# Patient Record
Sex: MALE | Race: WHITE | Employment: OTHER | URBAN - METROPOLITAN AREA
[De-identification: names, ages, dates, MRNs, and addresses within clinical notes are randomized per-mention and may not be internally consistent; named-entity substitution may affect disease eponyms.]

---

## 2021-02-26 ENCOUNTER — PROCEDURE VISIT (OUTPATIENT)
Dept: NEUROLOGY | Facility: CLINIC | Age: 66
End: 2021-02-26
Payer: MEDICARE

## 2021-02-26 VITALS — WEIGHT: 225 LBS | BODY MASS INDEX: 27.98 KG/M2 | HEIGHT: 75 IN

## 2021-02-26 DIAGNOSIS — R20.0 NUMBNESS IN FEET: Primary | ICD-10-CM

## 2021-02-26 PROCEDURE — 95911 NRV CNDJ TEST 9-10 STUDIES: CPT | Performed by: PHYSICAL MEDICINE & REHABILITATION

## 2021-02-26 PROCEDURE — 95886 MUSC TEST DONE W/N TEST COMP: CPT | Performed by: PHYSICAL MEDICINE & REHABILITATION

## 2021-02-26 NOTE — PROCEDURES
[address]   Nerve Conduction Study and Electromyography Procedure Note      Patient: Jinx Favre Hand Dominance:  RIGHT   Patient ID: [id number] Referring Dr:  Niru Figueroa   Sex: Male Test Dr:  Milena Young Lat leg Ankle NR NR NR NR Lat leg - Ankle 14 NR       H Reflex      Nerve H Lat No H ?    ms    R Tibial - Soleus NR No   Ref. ?35.00    L Tibial - Soleus 20.94 Yes   Ref. ?35.00        EMG Summary Table     Spontaneous MUAP Recruitment   Muscle Nerve R were found in 1 nerve(s), as follows:  • In the R Tibial - Soleus study  o the response was considered absent      Electromyography  The needle EMG study was normal in all 12 tested muscles: R. Vastus medialis, R. Gastrocnemius (Medial head), R. Peroneus l

## 2021-03-15 ENCOUNTER — MED REC SCAN ONLY (OUTPATIENT)
Dept: NEUROLOGY | Facility: CLINIC | Age: 66
End: 2021-03-15

## 2021-04-02 ENCOUNTER — MED REC SCAN ONLY (OUTPATIENT)
Dept: NEUROLOGY | Facility: CLINIC | Age: 66
End: 2021-04-02

## 2021-07-14 ENCOUNTER — OFFICE VISIT (OUTPATIENT)
Dept: NEUROLOGY | Facility: CLINIC | Age: 66
End: 2021-07-14
Payer: MEDICARE

## 2021-07-14 ENCOUNTER — TELEPHONE (OUTPATIENT)
Dept: NEUROLOGY | Facility: CLINIC | Age: 66
End: 2021-07-14

## 2021-07-14 ENCOUNTER — HOSPITAL ENCOUNTER (OUTPATIENT)
Dept: GENERAL RADIOLOGY | Age: 66
Discharge: HOME | End: 2021-07-14
Attending: PHYSICAL MEDICINE & REHABILITATION
Payer: MEDICARE

## 2021-07-14 VITALS
HEIGHT: 75 IN | SYSTOLIC BLOOD PRESSURE: 120 MMHG | WEIGHT: 215 LBS | BODY MASS INDEX: 26.73 KG/M2 | DIASTOLIC BLOOD PRESSURE: 68 MMHG | HEART RATE: 131 BPM | OXYGEN SATURATION: 98 %

## 2021-07-14 DIAGNOSIS — M51.16 LUMBAR DISC HERNIATION WITH RADICULOPATHY: ICD-10-CM

## 2021-07-14 DIAGNOSIS — M99.9 BIOMECHANICAL LESION: ICD-10-CM

## 2021-07-14 DIAGNOSIS — R20.0 NUMBNESS IN FEET: ICD-10-CM

## 2021-07-14 DIAGNOSIS — M79.10 MYALGIA: ICD-10-CM

## 2021-07-14 DIAGNOSIS — M47.816 FACET SYNDROME, LUMBAR: ICD-10-CM

## 2021-07-14 DIAGNOSIS — E07.9 THYROID DISEASE: ICD-10-CM

## 2021-07-14 DIAGNOSIS — M54.59 MECHANICAL LOW BACK PAIN: ICD-10-CM

## 2021-07-14 DIAGNOSIS — M51.37 DDD (DEGENERATIVE DISC DISEASE), LUMBOSACRAL: ICD-10-CM

## 2021-07-14 DIAGNOSIS — M25.552 LEFT HIP PAIN: ICD-10-CM

## 2021-07-14 DIAGNOSIS — G62.89 OTHER POLYNEUROPATHY: ICD-10-CM

## 2021-07-14 DIAGNOSIS — R20.0 NUMBNESS IN FEET: Primary | ICD-10-CM

## 2021-07-14 PROBLEM — G62.9 PERIPHERAL NEUROPATHY: Status: ACTIVE | Noted: 2021-07-14

## 2021-07-14 PROBLEM — M51.379 DDD (DEGENERATIVE DISC DISEASE), LUMBOSACRAL: Status: ACTIVE | Noted: 2021-07-14

## 2021-07-14 PROCEDURE — 73521 X-RAY EXAM HIPS BI 2 VIEWS: CPT | Performed by: PHYSICAL MEDICINE & REHABILITATION

## 2021-07-14 PROCEDURE — 99204 OFFICE O/P NEW MOD 45 MIN: CPT | Performed by: PHYSICAL MEDICINE & REHABILITATION

## 2021-07-14 PROCEDURE — 72110 X-RAY EXAM L-2 SPINE 4/>VWS: CPT | Performed by: PHYSICAL MEDICINE & REHABILITATION

## 2021-07-14 RX ORDER — CLONAZEPAM 0.5 MG/1
TABLET ORAL
COMMUNITY

## 2021-07-14 RX ORDER — ALPRAZOLAM 0.5 MG/1
TABLET ORAL
COMMUNITY
End: 2021-09-10

## 2021-07-14 RX ORDER — LEVOTHYROXINE SODIUM 0.07 MG/1
TABLET ORAL
COMMUNITY

## 2021-07-14 RX ORDER — AMOXICILLIN 500 MG
CAPSULE ORAL
COMMUNITY

## 2021-07-14 RX ORDER — CLONAZEPAM 0.5 MG/1
0.5 TABLET ORAL 3 TIMES DAILY
COMMUNITY
Start: 2021-06-28 | End: 2021-11-29

## 2021-07-14 RX ORDER — LEVOTHYROXINE SODIUM 0.07 MG/1
TABLET ORAL
COMMUNITY
Start: 2021-05-21 | End: 2021-09-10

## 2021-07-14 RX ORDER — GABAPENTIN 100 MG/1
100 CAPSULE ORAL 3 TIMES DAILY
Qty: 90 CAPSULE | Refills: 1 | Status: SHIPPED | OUTPATIENT
Start: 2021-07-14 | End: 2021-09-10

## 2021-07-14 RX ORDER — ALPRAZOLAM 0.5 MG/1
0.5 TABLET ORAL
COMMUNITY
Start: 2021-06-21

## 2021-07-14 RX ORDER — FOLIC ACID 1 MG/1
TABLET ORAL
COMMUNITY
Start: 2021-05-21 | End: 2021-09-10

## 2021-07-14 RX ORDER — FOLIC ACID 1 MG/1
TABLET ORAL
COMMUNITY

## 2021-07-14 NOTE — PATIENT INSTRUCTIONS
1) Make an appointment with your PCP to discuss thyroid function level and have them check folate and B12 as these can cause neuropathy  2) Get XR of the lumbar spine and pelvis today on your way out  3) My office will call you once the MRI is approved by

## 2021-07-14 NOTE — TELEPHONE ENCOUNTER
Per Medicare Guidelines -no authorization is required for imaging    Status: Approved-Covered Benefit     Patient notified he can proceed with scheduling L-Spine MRI CPT 09451.  Patient is leaving the office now and will call later to schedule imaging

## 2021-07-14 NOTE — H&P
[address] H&P    Requesting Physician: Rosemarie Holley MD    Chief Complaint (Reason for Visit):  Patient presents with:  New Patient: Bilateral low back pain.  Thalia Sotelo feels numbness from knee dfown to t history. PAST SURGICAL HISTORY:   History reviewed. No pertinent surgical history. FAMILY HISTORY:   History reviewed. No pertinent family history.     SOCIAL HISTORY:   Social History    Occupational History      Not on file    Tobacco Use      Smok negative. Pertinent positives and negatives noted in the HPI. PHYSICAL EXAM:   /68   Pulse (!) 131   Ht 75\"   Wt 215 lb (97.5 kg)   SpO2 98%   BMI 26.87 kg/m²     Body mass index is 26.87 kg/m².       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 Work-up recommended including metabolic, vitamin deficiency, or endocrine disease.   · The no response seen in the right tibial H reflex study can be due t start formal physical therapy and follow-up with me in 6 to 8 weeks after he begins therapy as well as after his MRI to discuss any potential treatment plans which may include epidural or facet joint injections.        RTC in 6 to 8 weeks    Discharge Instr

## 2021-07-27 ENCOUNTER — HOSPITAL ENCOUNTER (OUTPATIENT)
Dept: MRI IMAGING | Facility: HOSPITAL | Age: 66
Discharge: HOME | End: 2021-07-27
Attending: PHYSICAL MEDICINE & REHABILITATION
Payer: MEDICARE

## 2021-07-27 DIAGNOSIS — M54.59 MECHANICAL LOW BACK PAIN: ICD-10-CM

## 2021-07-27 DIAGNOSIS — M51.16 LUMBAR DISC HERNIATION WITH RADICULOPATHY: ICD-10-CM

## 2021-07-27 DIAGNOSIS — E07.9 THYROID DISEASE: ICD-10-CM

## 2021-07-27 DIAGNOSIS — M47.816 FACET SYNDROME, LUMBAR: ICD-10-CM

## 2021-07-27 DIAGNOSIS — M79.10 MYALGIA: ICD-10-CM

## 2021-07-27 DIAGNOSIS — M51.37 DDD (DEGENERATIVE DISC DISEASE), LUMBOSACRAL: ICD-10-CM

## 2021-07-27 DIAGNOSIS — M25.552 LEFT HIP PAIN: ICD-10-CM

## 2021-07-27 DIAGNOSIS — G62.89 OTHER POLYNEUROPATHY: ICD-10-CM

## 2021-07-27 DIAGNOSIS — M99.9 BIOMECHANICAL LESION: ICD-10-CM

## 2021-07-27 DIAGNOSIS — R20.0 NUMBNESS IN FEET: ICD-10-CM

## 2021-07-27 PROCEDURE — 72148 MRI LUMBAR SPINE W/O DYE: CPT | Performed by: PHYSICAL MEDICINE & REHABILITATION

## 2021-07-29 ENCOUNTER — PATIENT MESSAGE (OUTPATIENT)
Dept: NEUROLOGY | Facility: CLINIC | Age: 66
End: 2021-07-29

## 2021-07-29 NOTE — TELEPHONE ENCOUNTER
From: Abelardo Kumar  To: Gabriele Rodriguez MD  Sent: 7/29/2021 9:45 AM CDT  Subject: Test Results Question    Dr.Behar:  I have had the MRI you prescribed and the results are available. I also saw my PCP and will have the lab work he prescribed today.  I have als

## 2021-08-06 ENCOUNTER — MED REC SCAN ONLY (OUTPATIENT)
Dept: NEUROLOGY | Facility: CLINIC | Age: 66
End: 2021-08-06

## 2021-08-06 ENCOUNTER — TELEPHONE (OUTPATIENT)
Dept: NEUROLOGY | Facility: CLINIC | Age: 66
End: 2021-08-06

## 2021-08-06 ENCOUNTER — TELEMEDICINE (OUTPATIENT)
Dept: NEUROLOGY | Facility: CLINIC | Age: 66
End: 2021-08-06

## 2021-08-06 DIAGNOSIS — M51.37 DDD (DEGENERATIVE DISC DISEASE), LUMBOSACRAL: ICD-10-CM

## 2021-08-06 DIAGNOSIS — M79.10 MYALGIA: ICD-10-CM

## 2021-08-06 DIAGNOSIS — M25.552 LEFT HIP PAIN: ICD-10-CM

## 2021-08-06 DIAGNOSIS — R20.0 NUMBNESS IN FEET: Primary | ICD-10-CM

## 2021-08-06 DIAGNOSIS — M54.59 MECHANICAL LOW BACK PAIN: ICD-10-CM

## 2021-08-06 DIAGNOSIS — M51.16 LUMBAR DISC HERNIATION WITH RADICULOPATHY: ICD-10-CM

## 2021-08-06 DIAGNOSIS — E07.9 THYROID DISEASE: ICD-10-CM

## 2021-08-06 DIAGNOSIS — G62.89 OTHER POLYNEUROPATHY: ICD-10-CM

## 2021-08-06 DIAGNOSIS — M99.9 BIOMECHANICAL LESION: ICD-10-CM

## 2021-08-06 DIAGNOSIS — M47.816 FACET SYNDROME, LUMBAR: ICD-10-CM

## 2021-08-06 PROCEDURE — 99214 OFFICE O/P EST MOD 30 MIN: CPT | Performed by: PHYSICAL MEDICINE & REHABILITATION

## 2021-08-06 NOTE — TELEPHONE ENCOUNTER
Patient has been scheduled for a BILATERAL L5 transforaminal epidural steroid injections on 8/24/21 at the [address] [place] [address]. Medications and allergies reviewed.  Patient informed we will need verbal or written authorization from patients Primary Care Physician/Cardiolo

## 2021-08-06 NOTE — PATIENT INSTRUCTIONS
1) My office will call you to schedule the LEFT hip CSI under ultrasound guidance once the procedure is approved by your insurance carrier.     2) My office will call you to schedule the BILATRAL L5 TFESI under local anesthesia once the procedure is approve

## 2021-08-06 NOTE — TELEPHONE ENCOUNTER
Per Medicare guidelines-no authorization required for Lidocaine/Kenalog injections in office    LEFT hip CSI under ultrasound guidance CPT 44228+    Status: Approved-Covered Benefit    Routing to clinical staff to schedule    Per Medicare Guidelines -

## 2021-08-16 ENCOUNTER — OFFICE VISIT (OUTPATIENT)
Dept: NEUROLOGY | Facility: CLINIC | Age: 66
End: 2021-08-16
Payer: MEDICARE

## 2021-08-16 ENCOUNTER — MED REC SCAN ONLY (OUTPATIENT)
Dept: NEUROLOGY | Facility: CLINIC | Age: 66
End: 2021-08-16

## 2021-08-16 DIAGNOSIS — M25.859 FEMORAL ACETABULAR IMPINGEMENT: ICD-10-CM

## 2021-08-16 DIAGNOSIS — M25.552 LEFT HIP PAIN: ICD-10-CM

## 2021-08-16 DIAGNOSIS — M79.10 MYALGIA: Primary | ICD-10-CM

## 2021-08-16 PROCEDURE — 20611 DRAIN/INJ JOINT/BURSA W/US: CPT | Performed by: PHYSICAL MEDICINE & REHABILITATION

## 2021-08-16 RX ORDER — LIDOCAINE HYDROCHLORIDE 10 MG/ML
14 INJECTION, SOLUTION INFILTRATION; PERINEURAL ONCE
Status: COMPLETED | OUTPATIENT
Start: 2021-08-16 | End: 2021-08-16

## 2021-08-16 RX ORDER — TRIAMCINOLONE ACETONIDE 40 MG/ML
40 INJECTION, SUSPENSION INTRA-ARTICULAR; INTRAMUSCULAR ONCE
Status: COMPLETED | OUTPATIENT
Start: 2021-08-16 | End: 2021-08-16

## 2021-08-16 NOTE — PROCEDURES
[address]   Hip Injection Procedure Note    CHIEF COMPLAINT:  Patient presents with:   Injection: Left Hip injection csi       PROCEDURE PERFORMED:  LEFT hip  corticosteroid injection under ultrasound nadia assessment and plan. Patient is in agreement with the plan. All questions were answered. No barriers to learning identified. Permanent pictures were saved. INSTRUCTIONS GIVEN TO PATIENT:    \"You will see an effect in the next 2-3 days.   Please co

## 2021-08-24 ENCOUNTER — TELEPHONE (OUTPATIENT)
Dept: NEUROLOGY | Facility: CLINIC | Age: 66
End: 2021-08-24

## 2021-08-24 ENCOUNTER — OFFICE VISIT (OUTPATIENT)
Dept: SURGERY | Facility: CLINIC | Age: 66
End: 2021-08-24

## 2021-08-24 DIAGNOSIS — M51.16 LUMBAR DISC HERNIATION WITH RADICULOPATHY: ICD-10-CM

## 2021-08-24 DIAGNOSIS — M48.062 SPINAL STENOSIS OF LUMBAR REGION WITH NEUROGENIC CLAUDICATION: ICD-10-CM

## 2021-08-24 DIAGNOSIS — M47.816 LUMBAR SPONDYLOSIS: ICD-10-CM

## 2021-08-24 DIAGNOSIS — M51.37 DDD (DEGENERATIVE DISC DISEASE), LUMBOSACRAL: Primary | ICD-10-CM

## 2021-08-24 DIAGNOSIS — M48.061 LUMBAR FORAMINAL STENOSIS: ICD-10-CM

## 2021-08-24 DIAGNOSIS — M51.26 BULGE OF LUMBAR DISC WITHOUT MYELOPATHY: ICD-10-CM

## 2021-08-24 DIAGNOSIS — M54.59 LUMBAR TRIGGER POINT SYNDROME: ICD-10-CM

## 2021-08-24 DIAGNOSIS — M47.816 FACET SYNDROME, LUMBAR: ICD-10-CM

## 2021-08-24 DIAGNOSIS — M54.59 MECHANICAL LOW BACK PAIN: ICD-10-CM

## 2021-08-24 PROCEDURE — 64483 NJX AA&/STRD TFRM EPI L/S 1: CPT | Performed by: PHYSICAL MEDICINE & REHABILITATION

## 2021-08-24 NOTE — PROCEDURES
[address]    BILATERAL LUMBAR TRANSFORAMINAL   NAME:  Sam Chacon    MR #:    [id number] :  3/26/1955     PHYSICIAN:  Ermelinda Flynn MD        Operative Report    DATE OF PROCEDURE: 2021   PREOPERATIVE DIAGNOSES: 1. DDD (deg position, AP fluoroscopy was used to advance the needle to the 6 o'clock position on the right L5 pedicle. At this point in time, Omnipaque-240 contrast was used to obtain a good epidurogram indicating correct needle placement.   Then, aspiration was perfo

## 2021-09-10 ENCOUNTER — OFFICE VISIT (OUTPATIENT)
Dept: PHYSICAL MEDICINE AND REHAB | Facility: CLINIC | Age: 66
End: 2021-09-10
Payer: MEDICARE

## 2021-09-10 VITALS
OXYGEN SATURATION: 98 % | HEART RATE: 130 BPM | SYSTOLIC BLOOD PRESSURE: 116 MMHG | WEIGHT: 215 LBS | HEIGHT: 75 IN | DIASTOLIC BLOOD PRESSURE: 68 MMHG | BODY MASS INDEX: 26.73 KG/M2

## 2021-09-10 DIAGNOSIS — E07.9 THYROID DISEASE: ICD-10-CM

## 2021-09-10 DIAGNOSIS — R20.0 NUMBNESS IN FEET: ICD-10-CM

## 2021-09-10 DIAGNOSIS — M51.37 DDD (DEGENERATIVE DISC DISEASE), LUMBOSACRAL: ICD-10-CM

## 2021-09-10 DIAGNOSIS — M25.552 LEFT HIP PAIN: ICD-10-CM

## 2021-09-10 DIAGNOSIS — G62.9 PERIPHERAL POLYNEUROPATHY: Primary | ICD-10-CM

## 2021-09-10 DIAGNOSIS — M51.16 LUMBAR DISC HERNIATION WITH RADICULOPATHY: ICD-10-CM

## 2021-09-10 PROCEDURE — 99214 OFFICE O/P EST MOD 30 MIN: CPT | Performed by: PHYSICAL MEDICINE & REHABILITATION

## 2021-09-10 RX ORDER — GABAPENTIN 100 MG/1
200 CAPSULE ORAL 2 TIMES DAILY
Qty: 120 CAPSULE | Refills: 0 | Status: SHIPPED | OUTPATIENT
Start: 2021-09-10 | End: 2021-10-10

## 2021-09-10 NOTE — PATIENT INSTRUCTIONS
1) Increase gabapentin to 200 mg at nighttime and continue on 100 mg in the morning.  If you do not find it being to sedating, try increasing the morning dose to 200 mg as well  2) If gabapentin is not beneficial, then would recommend either starting pregab

## 2021-09-10 NOTE — PROGRESS NOTES
[address]  Progress Note    CHIEF COMPLAINT:  Patient presents with:  Low Back Pain: Patient f/u on Bilateral L5 TFESI (8/24/21) with 80% improvement presently.   C/o of bilateral low back pain (R>L) with N/ daily as needed. • Cholecalciferol 50 MCG (2000 UT) Oral Cap Take by mouth.      • clonazePAM 0.5 MG Oral Tab clonazepam 0.5 mg tablet   TAKE ONE TABLET BY MOUTH THREE TIMES DAILY     • folic acid 1 MG Oral Tab folic acid 1 mg tablet   Take 1 tablet(s) found for any previous visit.   ]      Radiology Imaging:  I reviewed with the patient his X-ray and MRI of the lumbar spine and pelvis bilateral  MRI SPINE LUMBAR (CPT=72148)  Narrative: PROCEDURE: MRI SPINE LUMBAR (CPT=72148)     COMPARISON: Lan Camargo mild-to-moderate left neural foraminal with mild right lateral recess stenosis.   L5-S1: Disc and facet related degeneration, which results in mild-to-moderate right greater than left neural foraminal stenosis with no spinal canal or lateral recess compromi JOINTS: Normal.     FLEXION/EXTENSION VIEWS:   No subluxation during flexion and extension.     OTHER:   Negative.                   Impression   CONCLUSION:   1.  Moderate-advanced L3-4 and L4-5 degenerative disc disease/spondylosis.  Less pronounced degen mononeuropathy.     There is no evidence for a  plexopathy or myopathy in the tested limb(s). ASSESSMENT AND PLAN:  Wilfred Hitchcock is a 58-year-old male. As a pertains with the epidural injection as well as corticosteroid injection.   As a pertains to the chroni

## 2021-09-30 ENCOUNTER — OFFICE VISIT (OUTPATIENT)
Dept: NEUROLOGY | Facility: CLINIC | Age: 66
End: 2021-09-30
Payer: MEDICARE

## 2021-09-30 ENCOUNTER — LAB ENCOUNTER (OUTPATIENT)
Dept: LAB | Facility: HOSPITAL | Age: 66
End: 2021-09-30
Attending: Other
Payer: MEDICARE

## 2021-09-30 VITALS
HEART RATE: 129 BPM | WEIGHT: 220 LBS | BODY MASS INDEX: 27.35 KG/M2 | SYSTOLIC BLOOD PRESSURE: 128 MMHG | HEIGHT: 75 IN | DIASTOLIC BLOOD PRESSURE: 72 MMHG

## 2021-09-30 DIAGNOSIS — G62.9 NEUROPATHY: ICD-10-CM

## 2021-09-30 DIAGNOSIS — G62.9 NEUROPATHY: Primary | ICD-10-CM

## 2021-09-30 PROCEDURE — 86039 ANTINUCLEAR ANTIBODIES (ANA): CPT

## 2021-09-30 PROCEDURE — 83036 HEMOGLOBIN GLYCOSYLATED A1C: CPT

## 2021-09-30 PROCEDURE — 99204 OFFICE O/P NEW MOD 45 MIN: CPT | Performed by: OTHER

## 2021-09-30 PROCEDURE — 82746 ASSAY OF FOLIC ACID SERUM: CPT

## 2021-09-30 PROCEDURE — 84165 PROTEIN E-PHORESIS SERUM: CPT

## 2021-09-30 PROCEDURE — 36415 COLL VENOUS BLD VENIPUNCTURE: CPT

## 2021-09-30 PROCEDURE — 86038 ANTINUCLEAR ANTIBODIES: CPT

## 2021-09-30 PROCEDURE — 82607 VITAMIN B-12: CPT

## 2021-09-30 PROCEDURE — 85652 RBC SED RATE AUTOMATED: CPT

## 2021-09-30 PROCEDURE — 82784 ASSAY IGA/IGD/IGG/IGM EACH: CPT

## 2021-09-30 RX ORDER — LATANOPROST 50 UG/ML
SOLUTION/ DROPS OPHTHALMIC
COMMUNITY

## 2021-09-30 NOTE — PROGRESS NOTES
Mr. Clover Awad, was referred by Ernesto Chappell, a personal friend. He relates a 2-3-year history of numbness paresthesias which started in the toes and has ascended to distal lower calf. He has also noticed some trouble with balance.   No numbness paresthesi the metabolic work-up–more recent B12, hemoglobin A1c, sed rate, VERÓNICA, serum protein electrophoresis, folic acid, immunoelectrophoresis. I do know that he is receiving B12 shots periodically and that he is on folic acid supplement.   Also asked him to inves

## 2021-10-07 ENCOUNTER — TELEPHONE (OUTPATIENT)
Dept: PHYSICAL MEDICINE AND REHAB | Facility: CLINIC | Age: 66
End: 2021-10-07

## 2021-10-07 NOTE — TELEPHONE ENCOUNTER
Patient is requesting a call back to review his lab work and discuss next steps of treatment. Please Advise.

## 2021-10-08 NOTE — TELEPHONE ENCOUNTER
Please call the patient tell him that his hemoglobin A1c is elevated. This is a sign of early diabetes. This may be causing the neuropathy. Please also ask him if he found out how much vitamin B6 he is taking.   Thank you

## 2021-10-08 NOTE — TELEPHONE ENCOUNTER
Pt returning call.      According to patient he takes 1 super B complex daily which contains 5mg of B6

## 2021-10-08 NOTE — TELEPHONE ENCOUNTER
Spoke to pt to notify of A1C & pt verbalized will follow with PCP. Pt states he takes a Super B-Complex (1 PO daily) which contains 5mg of B6.

## 2021-10-22 ENCOUNTER — TELEMEDICINE (OUTPATIENT)
Dept: PHYSICAL MEDICINE AND REHAB | Facility: CLINIC | Age: 66
End: 2021-10-22

## 2021-10-22 ENCOUNTER — TELEPHONE (OUTPATIENT)
Dept: NEUROLOGY | Facility: CLINIC | Age: 66
End: 2021-10-22

## 2021-10-22 DIAGNOSIS — M51.26 BULGE OF LUMBAR DISC WITHOUT MYELOPATHY: ICD-10-CM

## 2021-10-22 DIAGNOSIS — M54.59 LUMBAR TRIGGER POINT SYNDROME: ICD-10-CM

## 2021-10-22 DIAGNOSIS — M25.552 LEFT HIP PAIN: ICD-10-CM

## 2021-10-22 DIAGNOSIS — M48.061 LUMBAR FORAMINAL STENOSIS: ICD-10-CM

## 2021-10-22 DIAGNOSIS — M54.59 MECHANICAL LOW BACK PAIN: ICD-10-CM

## 2021-10-22 DIAGNOSIS — M47.816 FACET SYNDROME, LUMBAR: ICD-10-CM

## 2021-10-22 DIAGNOSIS — M25.859 FEMORAL ACETABULAR IMPINGEMENT: ICD-10-CM

## 2021-10-22 DIAGNOSIS — M79.10 MYALGIA: ICD-10-CM

## 2021-10-22 DIAGNOSIS — M48.062 SPINAL STENOSIS OF LUMBAR REGION WITH NEUROGENIC CLAUDICATION: ICD-10-CM

## 2021-10-22 DIAGNOSIS — E07.9 THYROID DISEASE: ICD-10-CM

## 2021-10-22 DIAGNOSIS — M99.9 BIOMECHANICAL LESION: ICD-10-CM

## 2021-10-22 DIAGNOSIS — R20.0 NUMBNESS IN FEET: ICD-10-CM

## 2021-10-22 DIAGNOSIS — M51.37 DDD (DEGENERATIVE DISC DISEASE), LUMBOSACRAL: ICD-10-CM

## 2021-10-22 DIAGNOSIS — M47.816 LUMBAR SPONDYLOSIS: ICD-10-CM

## 2021-10-22 DIAGNOSIS — G62.9 PERIPHERAL POLYNEUROPATHY: Primary | ICD-10-CM

## 2021-10-22 DIAGNOSIS — M51.16 LUMBAR DISC HERNIATION WITH RADICULOPATHY: ICD-10-CM

## 2021-10-22 PROCEDURE — 99214 OFFICE O/P EST MOD 30 MIN: CPT | Performed by: PHYSICAL MEDICINE & REHABILITATION

## 2021-10-22 NOTE — PATIENT INSTRUCTIONS
1) Stop gabapentin  2) My office will call you to schedule the BILATERAL L5 TFESI under local anesthesia once the procedure is approved by your insurance carrier.     3) Make appointment with [facility] to discuss other causes of neuropathy  4) Follow up

## 2021-10-22 NOTE — TELEPHONE ENCOUNTER
Per Medicare guidelines authorization is not required for BILATERAL L5 TFESI cpt codes 03794-83, 61367. Will inform Nursing.

## 2021-10-22 NOTE — TELEPHONE ENCOUNTER
Spoke to patient who request MAC sedation as he had local anesthetic for the previous procedure and does not wish to do that again.     Dr. Guanaco Reynoso agree to proceed w/ MAC    Patient has been scheduled for a Bilateral L5 TFESIs under MAC  on 11/10/21 at the

## 2021-10-22 NOTE — PROGRESS NOTES
[address]  Video Visit Progress Note      Telehealth outside of [address] Verbal Consent   I conducted a telehealth visit with Cesia Mohamud today, 10/22/21, which was completed using two-way, ulices bilateral and midline low back pain with left medial groin pain.   Status post left hip corticosteroid injection on and bilateral L5 transforaminal epidural steroid injection in August.  He is now complaining of continued bilateral low back pain where he fe Oral Tab levothyroxine 75 mcg tablet   Take 1 tablet every day by oral route for 90 days. • Omega-3 Fatty Acids (FISH OIL) 1200 MG Oral Cap Take by mouth.          ALLERGIES:   No Known Allergies    REVIEW OF SYSTEMS:   Review of Systems   Constitutiona Rey Escalona M.D.     Final   • Immunoglobulin A 09/30/2021 271.00  70.00 - 312.00 mg/dL Final   • Immunoglobulin G 09/30/2021 1,020  791-1,643 mg/dL Final   • Immunoglobulin M 09/30/2021 185.0  43.0 - 279.0 mg/dL Final   • HgbA1C 09/30/2021 5.9* <5.7 % Final   • redundancy. Minor left neural foraminal stenosis with no additional significant neural   compromise. L3-L4: Right eccentric disc bulge with dorsal epidural lipomatosis, ligamentum flavum redundancy, and bilateral facet arthropathy.   Mild-to-moderate spin this was significantly beneficial during her last procedure. I am also recommending he stop taking gabapentin as it has not been beneficial and follow-up with neurology which she would like to do at [facility].   He has a family friend who is a physician pain  Myalgia  Facet syndrome, lumbar  Biomechanical lesion  Femoral acetabular impingement    Roney Mccarty MD  Physical Medicine and Rehabilitation/Sports Medicine  MEDICAL CENTER [facility]

## 2021-11-05 ENCOUNTER — TELEPHONE (OUTPATIENT)
Dept: PHYSICAL MEDICINE AND REHAB | Facility: CLINIC | Age: 66
End: 2021-11-05

## 2021-11-05 DIAGNOSIS — Z20.822 ENCOUNTER FOR PREPROCEDURE SCREENING LABORATORY TESTING FOR COVID-19: Primary | ICD-10-CM

## 2021-11-05 DIAGNOSIS — Z01.812 ENCOUNTER FOR PREPROCEDURE SCREENING LABORATORY TESTING FOR COVID-19: Primary | ICD-10-CM

## 2021-11-05 NOTE — TELEPHONE ENCOUNTER
Tiffany spoke w Ev, states he is but would have to schedule that on his own and would need an order from PCP- i double checked and she said PCP no ordering provider    Tiffany informed patient, did not want to get order from PCP, will want order placed by

## 2021-11-05 NOTE — TELEPHONE ENCOUNTER
Order placed. Please let patient know and fax order to where he needs it faxed to. Can also discuss with [facility] if he can have an independent COVID test performed outside of the [facility] system and bring in the test results. Sirisha Hahn.  Twan Galeano MD, [facility] CAQSM  P

## 2021-11-05 NOTE — TELEPHONE ENCOUNTER
Patient will continue the test at [facility]. Does not want to do [facility]. Will schedule on Sunday.

## 2021-11-07 ENCOUNTER — LAB ENCOUNTER (OUTPATIENT)
Dept: LAB | Facility: HOSPITAL | Age: 66
End: 2021-11-07
Attending: PHYSICAL MEDICINE & REHABILITATION
Payer: MEDICARE

## 2021-11-07 DIAGNOSIS — Z01.812 ENCOUNTER FOR PREPROCEDURE SCREENING LABORATORY TESTING FOR COVID-19: ICD-10-CM

## 2021-11-07 DIAGNOSIS — Z20.822 ENCOUNTER FOR PREPROCEDURE SCREENING LABORATORY TESTING FOR COVID-19: ICD-10-CM

## 2021-11-10 ENCOUNTER — OFFICE VISIT (OUTPATIENT)
Dept: SURGERY | Facility: CLINIC | Age: 66
End: 2021-11-10

## 2021-11-10 DIAGNOSIS — M51.37 DDD (DEGENERATIVE DISC DISEASE), LUMBOSACRAL: Primary | ICD-10-CM

## 2021-11-10 DIAGNOSIS — M51.16 LUMBAR DISC HERNIATION WITH RADICULOPATHY: ICD-10-CM

## 2021-11-10 DIAGNOSIS — M48.061 LUMBAR FORAMINAL STENOSIS: ICD-10-CM

## 2021-11-10 DIAGNOSIS — M47.816 FACET SYNDROME, LUMBAR: ICD-10-CM

## 2021-11-10 DIAGNOSIS — M54.59 LUMBAR TRIGGER POINT SYNDROME: ICD-10-CM

## 2021-11-10 DIAGNOSIS — M54.59 MECHANICAL LOW BACK PAIN: ICD-10-CM

## 2021-11-10 DIAGNOSIS — M48.062 SPINAL STENOSIS OF LUMBAR REGION WITH NEUROGENIC CLAUDICATION: ICD-10-CM

## 2021-11-10 DIAGNOSIS — M47.816 LUMBAR SPONDYLOSIS: ICD-10-CM

## 2021-11-10 DIAGNOSIS — M51.26 BULGE OF LUMBAR DISC WITHOUT MYELOPATHY: ICD-10-CM

## 2021-11-10 PROCEDURE — 64483 NJX AA&/STRD TFRM EPI L/S 1: CPT | Performed by: PHYSICAL MEDICINE & REHABILITATION

## 2021-11-10 NOTE — PROCEDURES
[address]    BILATERAL LUMBAR TRANSFORAMINAL   NAME:  Joselyn Greco    MR #:    [id number] :  3/26/1955     PHYSICIAN:  Nitza Ferris MD        Operative Report    DATE OF PROCEDURE: 11/10/2021   PREOPERATIVE DIAGNOSES: 1. DDD (de AP fluoroscopy was used to advance the needle to the 6 o'clock position on the right L5 pedicle. At this point in time, Omnipaque-240 contrast was used to obtain a good epidurogram indicating correct needle placement. Then, aspiration was performed.   No

## 2021-11-29 ENCOUNTER — OFFICE VISIT (OUTPATIENT)
Dept: PHYSICAL MEDICINE AND REHAB | Facility: CLINIC | Age: 66
End: 2021-11-29
Payer: MEDICARE

## 2021-11-29 ENCOUNTER — MED REC SCAN ONLY (OUTPATIENT)
Dept: PHYSICAL MEDICINE AND REHAB | Facility: CLINIC | Age: 66
End: 2021-11-29

## 2021-11-29 ENCOUNTER — TELEPHONE (OUTPATIENT)
Dept: PHYSICAL MEDICINE AND REHAB | Facility: CLINIC | Age: 66
End: 2021-11-29

## 2021-11-29 VITALS
OXYGEN SATURATION: 98 % | BODY MASS INDEX: 27.35 KG/M2 | DIASTOLIC BLOOD PRESSURE: 80 MMHG | WEIGHT: 220 LBS | HEIGHT: 75 IN | SYSTOLIC BLOOD PRESSURE: 124 MMHG | HEART RATE: 93 BPM

## 2021-11-29 DIAGNOSIS — M25.859 FEMORAL ACETABULAR IMPINGEMENT: ICD-10-CM

## 2021-11-29 DIAGNOSIS — M51.16 LUMBAR DISC HERNIATION WITH RADICULOPATHY: ICD-10-CM

## 2021-11-29 DIAGNOSIS — M48.062 SPINAL STENOSIS OF LUMBAR REGION WITH NEUROGENIC CLAUDICATION: ICD-10-CM

## 2021-11-29 DIAGNOSIS — M51.26 BULGE OF LUMBAR DISC WITHOUT MYELOPATHY: ICD-10-CM

## 2021-11-29 DIAGNOSIS — E07.9 THYROID DISEASE: ICD-10-CM

## 2021-11-29 DIAGNOSIS — M48.061 LUMBAR FORAMINAL STENOSIS: ICD-10-CM

## 2021-11-29 DIAGNOSIS — M51.37 DDD (DEGENERATIVE DISC DISEASE), LUMBOSACRAL: ICD-10-CM

## 2021-11-29 DIAGNOSIS — M54.59 LUMBAR TRIGGER POINT SYNDROME: ICD-10-CM

## 2021-11-29 DIAGNOSIS — G62.9 PERIPHERAL POLYNEUROPATHY: ICD-10-CM

## 2021-11-29 DIAGNOSIS — M54.59 MECHANICAL LOW BACK PAIN: ICD-10-CM

## 2021-11-29 DIAGNOSIS — M47.816 FACET SYNDROME, LUMBAR: ICD-10-CM

## 2021-11-29 DIAGNOSIS — M99.9 BIOMECHANICAL LESION: ICD-10-CM

## 2021-11-29 DIAGNOSIS — M25.552 LEFT HIP PAIN: Primary | ICD-10-CM

## 2021-11-29 DIAGNOSIS — M79.10 MYALGIA: ICD-10-CM

## 2021-11-29 DIAGNOSIS — R20.0 NUMBNESS IN FEET: ICD-10-CM

## 2021-11-29 DIAGNOSIS — M47.816 LUMBAR SPONDYLOSIS: ICD-10-CM

## 2021-11-29 PROBLEM — M51.36 BULGE OF LUMBAR DISC WITHOUT MYELOPATHY: Status: ACTIVE | Noted: 2021-11-29

## 2021-11-29 PROBLEM — M51.369 BULGE OF LUMBAR DISC WITHOUT MYELOPATHY: Status: ACTIVE | Noted: 2021-11-29

## 2021-11-29 PROCEDURE — 99214 OFFICE O/P EST MOD 30 MIN: CPT | Performed by: PHYSICAL MEDICINE & REHABILITATION

## 2021-11-29 PROCEDURE — 20611 DRAIN/INJ JOINT/BURSA W/US: CPT | Performed by: PHYSICAL MEDICINE & REHABILITATION

## 2021-11-29 RX ORDER — TRIAMCINOLONE ACETONIDE 40 MG/ML
40 INJECTION, SUSPENSION INTRA-ARTICULAR; INTRAMUSCULAR ONCE
Status: COMPLETED | OUTPATIENT
Start: 2021-11-29 | End: 2021-11-29

## 2021-11-29 RX ORDER — LIDOCAINE HYDROCHLORIDE 10 MG/ML
14 INJECTION, SOLUTION INFILTRATION; PERINEURAL ONCE
Status: COMPLETED | OUTPATIENT
Start: 2021-11-29 | End: 2021-11-29

## 2021-11-29 NOTE — PATIENT INSTRUCTIONS
1) My office will call you to schedule the LEFT hip CSI  Under ultrasound guidance once the procedure is approved by your insurance carrier.     2) [facility] for the rotator cuff tendinopathy and shoulder bursitis  3) Follow up with me in about

## 2021-11-29 NOTE — PROCEDURES
[address]   Hip Injection Procedure Note    CHIEF COMPLAINT:  Patient presents with:  Low Back Pain: LOV 9/10/21 and [facility] 11/10/21. Injection help 50% but still having pain and pt is doing home excersies. Titer/Pattern 09/30/2021 80 * <80 Final   • Reviewed By: 09/30/2021 Benny Michelle M.D. Final   ]    PROCEDURE:    The risks and benefits of intraarticular corticosteroid injection were again explained to the patient and verbal consent was obtained.  The l reasonable time, please report directly to the emergency room for further evaluation\"      Woo Delgadillo.  Jorden Butts MD, [facility]  Physical Medicine and Rehabilitation/Sports Medicine  MEDICAL CENTER [facility]

## 2021-11-29 NOTE — PROGRESS NOTES
[address]  Progress Note    CHIEF COMPLAINT:  Patient presents with:  Low Back Pain: LOV 9/10/21 and [facility] 11/10/21. Injection help 50% but still having pain and pt is doing home excersies.   Hip Pain: Left H • latanoprost 0.005 % Ophthalmic Solution Apply to eye. • ALPRAZolam 0.5 MG Oral Tab Take 0.5 mg by mouth daily as needed. • Cholecalciferol 50 MCG (2000 UT) Oral Cap Take by mouth.      • clonazePAM 0.5 MG Oral Tab clonazepam 0.5 mg tablet   TAKE no scoliosis or kyphosis.   Palpation: Tender to palpation over the right lower lumbar facets and quadratus lumborum muscle  ROM: Full active range of motion of the lumbar spine with pain during extension as well as extension rotation to the right  Facet Lo 4. 26  3.75 - 5.21 g/dL Final   • Alpha-1-Globulins 09/30/2021 0.26  0.19 - 0.46 g/dL Final   • Alpha-2-Globulins 09/30/2021 0.63  0.48 - 1.05 g/dL Final   • Beta Globulins 09/30/2021 0.82  0.68 - 1.23 g/dL Final   • Gamma Globulins 09/30/2021 1.03  0.62 - spine.  CORD/CAUDA EQUINA: The distal cord and nerve roots have normal caliber, contour, and signal intensity. PARASPINAL AREA: No visible mass. OTHER: Negative.      LUMBAR DISC LEVELS:  L1-L2: No significant disc/facet abnormality, spinal stenosis, or 7/27/2021 at 12:39 PM       Finalized by (CST): Geovanna Thompson MD on 7/27/2021 at 12:47 PM              ASSESSMENT AND PLAN:  Marleen Wilks is a pleasant 70-year-old male presents for follow-up of his bilateral low back pain status post bilateral L5 transforaminal myelopathy  Spinal stenosis of lumbar region with neurogenic claudication    Roney Salinas MD  Physical Medicine and Rehabilitation/Sports Medicine  MEDICAL CENTER [facility]

## 2021-12-08 ENCOUNTER — OFFICE VISIT (OUTPATIENT)
Dept: SURGERY | Facility: CLINIC | Age: 66
End: 2021-12-08

## 2021-12-08 DIAGNOSIS — M47.816 FACET SYNDROME, LUMBAR: ICD-10-CM

## 2021-12-08 DIAGNOSIS — M48.062 SPINAL STENOSIS OF LUMBAR REGION WITH NEUROGENIC CLAUDICATION: ICD-10-CM

## 2021-12-08 DIAGNOSIS — M54.59 LUMBAR TRIGGER POINT SYNDROME: Primary | ICD-10-CM

## 2021-12-08 DIAGNOSIS — M51.26 BULGE OF LUMBAR DISC WITHOUT MYELOPATHY: ICD-10-CM

## 2021-12-08 DIAGNOSIS — M48.061 LUMBAR FORAMINAL STENOSIS: ICD-10-CM

## 2021-12-08 DIAGNOSIS — M54.59 MECHANICAL LOW BACK PAIN: ICD-10-CM

## 2021-12-08 DIAGNOSIS — M51.16 LUMBAR DISC HERNIATION WITH RADICULOPATHY: ICD-10-CM

## 2021-12-08 DIAGNOSIS — M47.816 LUMBAR SPONDYLOSIS: ICD-10-CM

## 2021-12-08 DIAGNOSIS — M51.37 DDD (DEGENERATIVE DISC DISEASE), LUMBOSACRAL: ICD-10-CM

## 2021-12-08 PROCEDURE — 64494 INJ PARAVERT F JNT L/S 2 LEV: CPT | Performed by: PHYSICAL MEDICINE & REHABILITATION

## 2021-12-08 PROCEDURE — 64495 INJ PARAVERT F JNT L/S 3 LEV: CPT | Performed by: PHYSICAL MEDICINE & REHABILITATION

## 2021-12-08 PROCEDURE — 64493 INJ PARAVERT F JNT L/S 1 LEV: CPT | Performed by: PHYSICAL MEDICINE & REHABILITATION

## 2021-12-08 NOTE — PROCEDURES
[address].    LUMBAR MEDIAL BRANCH BLOCK   NAME:  Kike Barnett    MR #:    [id number] :  3/26/1955     PHYSICIAN:  Ynes Gates MD        Operative Report    DATE OF PROCEDURE: 2021   PREOPERATIVE DIAGNOSES: 1.  Lumbar trigge inserted and directed to the above stated sites. When they felt to be in good position, oblique fluoroscopy was used to confirm needle placement at the eye of the Ilya dog each level.    At this time, Omnipaque 240 contrast was used to obtain a good fas

## 2021-12-13 ENCOUNTER — TELEPHONE (OUTPATIENT)
Dept: PHYSICAL MEDICINE AND REHAB | Facility: CLINIC | Age: 66
End: 2021-12-13

## 2021-12-13 DIAGNOSIS — Z01.812 ENCOUNTER FOR PREPROCEDURE SCREENING LABORATORY TESTING FOR COVID-19: ICD-10-CM

## 2021-12-13 DIAGNOSIS — M51.16 LUMBAR DISC HERNIATION WITH RADICULOPATHY: ICD-10-CM

## 2021-12-13 DIAGNOSIS — M54.59 MECHANICAL LOW BACK PAIN: ICD-10-CM

## 2021-12-13 DIAGNOSIS — R20.0 NUMBNESS IN FEET: ICD-10-CM

## 2021-12-13 DIAGNOSIS — M51.26 BULGE OF LUMBAR DISC WITHOUT MYELOPATHY: ICD-10-CM

## 2021-12-13 DIAGNOSIS — M48.062 SPINAL STENOSIS OF LUMBAR REGION WITH NEUROGENIC CLAUDICATION: ICD-10-CM

## 2021-12-13 DIAGNOSIS — Z20.822 ENCOUNTER FOR PREPROCEDURE SCREENING LABORATORY TESTING FOR COVID-19: ICD-10-CM

## 2021-12-13 DIAGNOSIS — M99.9 BIOMECHANICAL LESION: ICD-10-CM

## 2021-12-13 DIAGNOSIS — M54.59 LUMBAR TRIGGER POINT SYNDROME: Primary | ICD-10-CM

## 2021-12-13 DIAGNOSIS — M47.816 FACET SYNDROME, LUMBAR: ICD-10-CM

## 2021-12-13 DIAGNOSIS — M47.816 LUMBAR SPONDYLOSIS: ICD-10-CM

## 2021-12-13 NOTE — TELEPHONE ENCOUNTER
pt left a message over the weekend  :   Pt has a INJ done 12/8 . Pain still there ,pt  doesn't feel any difference .   Please assist

## 2021-12-15 NOTE — TELEPHONE ENCOUNTER
S/w patient states 20% improvement for the 8hrs. States he's uncomfortable, in low back and shoulders. Patient is considering seeing an orthopedic and just started PT.      Spoke with Dr. Tila Farr in regards of what his next steps are for patient, patient has

## 2022-01-06 ENCOUNTER — TELEPHONE (OUTPATIENT)
Dept: PHYSICAL MEDICINE AND REHAB | Facility: CLINIC | Age: 67
End: 2022-01-06

## 2022-01-06 NOTE — TELEPHONE ENCOUNTER
Patient calling with condtion update-Minor relief from injection - Having pain please call to discuss next steps

## 2022-01-10 NOTE — TELEPHONE ENCOUNTER
I spoke with Balbina Tran on Friday, 1/7/2022 and discussed his continued low back pain which is worse than his right shoulder pain.   Therefore, I am recommending a bilateral L5 transforaminal epidural steroid injection which has been beneficial in the past for him

## 2022-01-10 NOTE — TELEPHONE ENCOUNTER
Per Medicare guidelines Authorization is not required. BILATERAL L5 TFESI cpt codes [id number], [id number]. Will inform Nursing.

## 2022-02-04 ENCOUNTER — TELEPHONE (OUTPATIENT)
Dept: PHYSICAL MEDICINE AND REHAB | Facility: CLINIC | Age: 67
End: 2022-02-04

## 2022-02-08 NOTE — TELEPHONE ENCOUNTER
Spoke with Physical therapist López Mtz,  States she's been taking care of him since 12/2021 on and off, would like to update Dr. Megan Garcia in regards of patient's progress, which is none, a lot of stiffness where it's causing a compression in low back, numbness and tingling in feet is not improving. Very restricted in ROM. Patient is just not getting any better, does not want to do injections for only short term relief. Wants to know what we can do       [email or web address]  [phone]   You may call or text her to talk to her.      NOV: None

## 2022-02-09 NOTE — TELEPHONE ENCOUNTER
Spoke with Elizabeth Barksdale yesterday and discussed Trent's case. Would recommend further neurological consultation for neuropathy and continued mobility PT. Teena Levy.  Monet Fulton MD, [facility]  Physical Medicine and Rehabilitation/Sports Medicine  MEDICAL CENTER [facility]

## 2022-07-07 NOTE — PROGRESS NOTES
[address]  Video Visit Progress Note      Telehealth outside of [address] Verbal Consent   I conducted a telehealth visit with Tejal Beverly today, 08/06/21, which was completed using two-wayulices well as bilateral and midline low back pain with left medial groin pain. He had an MRI of the lumbar spine which I reviewed with him today.   He continues to have left-sided hip pain and groin pain as well as low back pain with radiation down the right leg 1 tablet every day by oral route for 90 days. • gabapentin 100 MG Oral Cap Take 1 capsule (100 mg total) by mouth 3 (three) times daily.  90 capsule 1       ALLERGIES:   No Known Allergies    REVIEW OF SYSTEMS:   Review of Systems   Constitutional: Isaac Stewart the expected lumbar lordosis. Slight levoscoliosis. BONES: No acute lumbar spine fracture. Mixed fatty and edematous endplate degenerative changes at L3-L4. Additional fatty endplate degenerative changes at L4-L5.   No additional suspect marrow replacin 6. Mixed edematous and fatty endplate degenerative changes at L3-L4. Additional fatty endplate degenerative changes at L4-L5.     7. Slight levoscoliosis of the lumbar spine. 8. Lesser incidental findings as above.         Remote - PS     Dictated by an office visit; however, this limits the ability to perform a thorough physical examination which may affect objective findings related to a specific condition and can affect treatment.   We also discussed that NSAIDs may mask or worsen COVID-19 infection Specialty Care (Immediate)...

## 2022-10-31 ENCOUNTER — PATIENT MESSAGE (OUTPATIENT)
Dept: PHYSICAL MEDICINE AND REHAB | Facility: CLINIC | Age: 67
End: 2022-10-31

## 2022-10-31 ENCOUNTER — OFFICE VISIT (OUTPATIENT)
Dept: PHYSICAL MEDICINE AND REHAB | Facility: CLINIC | Age: 67
End: 2022-10-31
Payer: MEDICARE

## 2022-10-31 VITALS
OXYGEN SATURATION: 98 % | BODY MASS INDEX: 27.73 KG/M2 | DIASTOLIC BLOOD PRESSURE: 66 MMHG | SYSTOLIC BLOOD PRESSURE: 82 MMHG | WEIGHT: 223 LBS | HEIGHT: 75 IN | HEART RATE: 65 BPM

## 2022-10-31 DIAGNOSIS — M75.01 BILATERAL ADHESIVE CAPSULITIS OF SHOULDERS: ICD-10-CM

## 2022-10-31 DIAGNOSIS — M75.02 BILATERAL ADHESIVE CAPSULITIS OF SHOULDERS: ICD-10-CM

## 2022-10-31 DIAGNOSIS — M47.816 FACET SYNDROME, LUMBAR: ICD-10-CM

## 2022-10-31 DIAGNOSIS — G62.9 PERIPHERAL POLYNEUROPATHY: ICD-10-CM

## 2022-10-31 DIAGNOSIS — R20.0 NUMBNESS IN FEET: ICD-10-CM

## 2022-10-31 DIAGNOSIS — M54.59 MECHANICAL LOW BACK PAIN: ICD-10-CM

## 2022-10-31 DIAGNOSIS — Z20.822 ENCOUNTER FOR PREPROCEDURE SCREENING LABORATORY TESTING FOR COVID-19: ICD-10-CM

## 2022-10-31 DIAGNOSIS — M54.59 LUMBAR TRIGGER POINT SYNDROME: Primary | ICD-10-CM

## 2022-10-31 DIAGNOSIS — M75.40 SHOULDER IMPINGEMENT SYNDROME, UNSPECIFIED LATERALITY: ICD-10-CM

## 2022-10-31 DIAGNOSIS — M48.062 SPINAL STENOSIS OF LUMBAR REGION WITH NEUROGENIC CLAUDICATION: ICD-10-CM

## 2022-10-31 DIAGNOSIS — M67.919 TENDINOPATHY OF ROTATOR CUFF, UNSPECIFIED LATERALITY: ICD-10-CM

## 2022-10-31 DIAGNOSIS — E07.9 THYROID DISEASE: ICD-10-CM

## 2022-10-31 DIAGNOSIS — M51.37 DDD (DEGENERATIVE DISC DISEASE), LUMBOSACRAL: ICD-10-CM

## 2022-10-31 DIAGNOSIS — M99.9 BIOMECHANICAL LESION: ICD-10-CM

## 2022-10-31 DIAGNOSIS — Z01.812 ENCOUNTER FOR PREPROCEDURE SCREENING LABORATORY TESTING FOR COVID-19: ICD-10-CM

## 2022-10-31 DIAGNOSIS — M51.16 LUMBAR DISC HERNIATION WITH RADICULOPATHY: ICD-10-CM

## 2022-10-31 DIAGNOSIS — G89.29 CHRONIC PAIN OF BOTH SHOULDERS: ICD-10-CM

## 2022-10-31 DIAGNOSIS — M79.10 MYALGIA: ICD-10-CM

## 2022-10-31 DIAGNOSIS — M51.36 BULGE OF LUMBAR DISC WITHOUT MYELOPATHY: ICD-10-CM

## 2022-10-31 DIAGNOSIS — M25.859 FEMORAL ACETABULAR IMPINGEMENT: ICD-10-CM

## 2022-10-31 DIAGNOSIS — M25.511 CHRONIC PAIN OF BOTH SHOULDERS: ICD-10-CM

## 2022-10-31 DIAGNOSIS — M47.816 LUMBAR SPONDYLOSIS: ICD-10-CM

## 2022-10-31 DIAGNOSIS — M48.061 LUMBAR FORAMINAL STENOSIS: ICD-10-CM

## 2022-10-31 DIAGNOSIS — M25.512 CHRONIC PAIN OF BOTH SHOULDERS: ICD-10-CM

## 2022-10-31 DIAGNOSIS — M25.552 LEFT HIP PAIN: ICD-10-CM

## 2022-10-31 RX ORDER — SACUBITRIL AND VALSARTAN 24; 26 MG/1; MG/1
TABLET, FILM COATED ORAL
COMMUNITY
Start: 2022-06-23

## 2022-10-31 RX ORDER — ATORVASTATIN CALCIUM 40 MG/1
40 TABLET, FILM COATED ORAL NIGHTLY
COMMUNITY
Start: 2022-10-03

## 2022-10-31 RX ORDER — CARVEDILOL 25 MG/1
TABLET ORAL
COMMUNITY
Start: 2022-10-21

## 2022-10-31 RX ORDER — SPIRONOLACTONE 25 MG/1
25 TABLET ORAL
COMMUNITY
Start: 2022-08-30

## 2022-10-31 RX ORDER — RIVAROXABAN 20 MG/1
20 TABLET, FILM COATED ORAL
COMMUNITY
Start: 2022-10-13

## 2022-10-31 RX ORDER — DAPAGLIFLOZIN 10 MG/1
10 TABLET, FILM COATED ORAL DAILY
COMMUNITY
Start: 2022-10-02

## 2022-10-31 NOTE — PATIENT INSTRUCTIONS
1) My office will call you to schedule the BILATERAL [address] CSI under ultrasound guidance once the procedure is approved by your insurance carrier. 2) Begin physical therapy for the bilateral shoulders  3) Tylenol 500-1000 mg every 6-8 hours as needed for pain. No more than 3000 mg daily. 4) Please call and schedule your MRI at [phone]. Once you have your MRI scheduled, then call my office again to schedule a follow-up visit soon after your MRI so we may review the images together.  Please set up for pacemaker to be turned off for MRI

## 2022-11-14 ENCOUNTER — TELEPHONE (OUTPATIENT)
Dept: NEUROLOGY | Facility: CLINIC | Age: 67
End: 2022-11-14

## 2022-11-14 ENCOUNTER — OFFICE VISIT (OUTPATIENT)
Dept: PHYSICAL MEDICINE AND REHAB | Facility: CLINIC | Age: 67
End: 2022-11-14
Payer: MEDICARE

## 2022-11-14 DIAGNOSIS — M67.919 TENDINOPATHY OF ROTATOR CUFF, UNSPECIFIED LATERALITY: ICD-10-CM

## 2022-11-14 DIAGNOSIS — M25.511 CHRONIC PAIN OF BOTH SHOULDERS: Primary | ICD-10-CM

## 2022-11-14 DIAGNOSIS — M25.512 CHRONIC PAIN OF BOTH SHOULDERS: Primary | ICD-10-CM

## 2022-11-14 DIAGNOSIS — M75.40 SHOULDER IMPINGEMENT SYNDROME, UNSPECIFIED LATERALITY: ICD-10-CM

## 2022-11-14 DIAGNOSIS — G89.29 CHRONIC PAIN OF BOTH SHOULDERS: Primary | ICD-10-CM

## 2022-11-14 DIAGNOSIS — M75.02 BILATERAL ADHESIVE CAPSULITIS OF SHOULDERS: ICD-10-CM

## 2022-11-14 DIAGNOSIS — M75.01 BILATERAL ADHESIVE CAPSULITIS OF SHOULDERS: ICD-10-CM

## 2022-11-14 NOTE — TELEPHONE ENCOUNTER
Per Medicare guidelines authorization is not required for Bilateral Glenohumeral Corticosteroid Injections under Ultrasound Guidance cpt codes 74261.  x 2. Will inform Nursing.

## 2022-11-14 NOTE — PATIENT INSTRUCTIONS
Post Injection Instructions     Please do not do anything strenuous over the next two days (if you had a knee injection do not walk more than 2 city blocks, do not attend any aerobic classes, do not run, no heavy lifting, no prolong standing). You may resume your day to day activities after your injection. You may experience some mild amount of swelling after the procedure. Please ice your joint that was injected at least 5-6 times a day (15 minutes) for two days after (this will help prevent worsening pain that sometimes occurs after an injection). Only take tylenol if needed for pain for the first few days. Watch for signs of infection which include redness, warmth, worsening pain, fevers or chills. If you develop any of these signs call the office immediately at [phone]    Everyone responds differently to injections, but you can expect your peak effects a few weeks after your last injection. Butch Herrera.  Kia Mas MD  Physical Medicine and Rehabilitation/Sports Medicine  MEDICAL CENTER [facility]

## 2022-12-20 ENCOUNTER — HOSPITAL ENCOUNTER (OUTPATIENT)
Dept: MRI IMAGING | Facility: HOSPITAL | Age: 67
Discharge: HOME | End: 2022-12-20
Attending: PHYSICAL MEDICINE & REHABILITATION
Payer: MEDICARE

## 2022-12-20 DIAGNOSIS — M48.062 SPINAL STENOSIS OF LUMBAR REGION WITH NEUROGENIC CLAUDICATION: ICD-10-CM

## 2022-12-20 DIAGNOSIS — M25.512 CHRONIC PAIN OF BOTH SHOULDERS: ICD-10-CM

## 2022-12-20 DIAGNOSIS — R20.0 NUMBNESS IN FEET: ICD-10-CM

## 2022-12-20 DIAGNOSIS — M99.9 BIOMECHANICAL LESION: ICD-10-CM

## 2022-12-20 DIAGNOSIS — Z20.822 ENCOUNTER FOR PREPROCEDURE SCREENING LABORATORY TESTING FOR COVID-19: ICD-10-CM

## 2022-12-20 DIAGNOSIS — M51.37 DDD (DEGENERATIVE DISC DISEASE), LUMBOSACRAL: ICD-10-CM

## 2022-12-20 DIAGNOSIS — M54.59 LUMBAR TRIGGER POINT SYNDROME: ICD-10-CM

## 2022-12-20 DIAGNOSIS — M75.02 BILATERAL ADHESIVE CAPSULITIS OF SHOULDERS: ICD-10-CM

## 2022-12-20 DIAGNOSIS — M67.919 TENDINOPATHY OF ROTATOR CUFF, UNSPECIFIED LATERALITY: ICD-10-CM

## 2022-12-20 DIAGNOSIS — M51.36 BULGE OF LUMBAR DISC WITHOUT MYELOPATHY: ICD-10-CM

## 2022-12-20 DIAGNOSIS — M25.552 LEFT HIP PAIN: ICD-10-CM

## 2022-12-20 DIAGNOSIS — M47.816 LUMBAR SPONDYLOSIS: ICD-10-CM

## 2022-12-20 DIAGNOSIS — M75.01 BILATERAL ADHESIVE CAPSULITIS OF SHOULDERS: ICD-10-CM

## 2022-12-20 DIAGNOSIS — Z01.812 ENCOUNTER FOR PREPROCEDURE SCREENING LABORATORY TESTING FOR COVID-19: ICD-10-CM

## 2022-12-20 DIAGNOSIS — E07.9 THYROID DISEASE: ICD-10-CM

## 2022-12-20 DIAGNOSIS — M75.40 SHOULDER IMPINGEMENT SYNDROME, UNSPECIFIED LATERALITY: ICD-10-CM

## 2022-12-20 DIAGNOSIS — M25.511 CHRONIC PAIN OF BOTH SHOULDERS: ICD-10-CM

## 2022-12-20 DIAGNOSIS — M79.10 MYALGIA: ICD-10-CM

## 2022-12-20 DIAGNOSIS — M51.16 LUMBAR DISC HERNIATION WITH RADICULOPATHY: ICD-10-CM

## 2022-12-20 DIAGNOSIS — M54.59 MECHANICAL LOW BACK PAIN: ICD-10-CM

## 2022-12-20 DIAGNOSIS — G62.9 PERIPHERAL POLYNEUROPATHY: ICD-10-CM

## 2022-12-20 DIAGNOSIS — M47.816 FACET SYNDROME, LUMBAR: ICD-10-CM

## 2022-12-20 DIAGNOSIS — M25.859 FEMORAL ACETABULAR IMPINGEMENT: ICD-10-CM

## 2022-12-20 DIAGNOSIS — M48.061 LUMBAR FORAMINAL STENOSIS: ICD-10-CM

## 2022-12-20 DIAGNOSIS — G89.29 CHRONIC PAIN OF BOTH SHOULDERS: ICD-10-CM

## 2022-12-20 PROCEDURE — 73221 MRI JOINT UPR EXTREM W/O DYE: CPT | Performed by: PHYSICAL MEDICINE & REHABILITATION

## 2022-12-20 NOTE — IMAGING NOTE
0944 PT HERE FOR MRI BILATERAL SHOULDERS, PT HAS A [employer] PACEMAKER, VS TAKEN, AND MONITORED THRU OUT SCAN. [employer] PACEMAKER SET TO SAFE MODE FOR MRI VIA REMOTELY WITH [employer]  BPM SET.      VS /61   PULSE OX 95% ON ROOM AIR SCANNING CONTINUES     0928 VS BP 93/69   PULSE OX 94 % ON ROOM AIR SCANNING CONTINUES     0936  VS /71   PULSE OX 95 % ON ROOM AIR SCANNING CONTINUES     0946  VS /73   PULSE OX 94 % ON ROOM AIR SCANNING CONTINUES     0956  VS BP 98/69   PULSE OX 94 % ON ROOM AIR    1009 SCANNING ENDED VS /75  PULSE OX 95% ON ROOM AIR    1011 SURE SCAN TURNED OFF TO PACEMAKER [employer]  REMOTELY BY MARIN FRIED TECH, PT COMFORTABLE AND STABLE

## 2023-01-06 ENCOUNTER — TELEMEDICINE (OUTPATIENT)
Dept: PHYSICAL MEDICINE AND REHAB | Facility: CLINIC | Age: 68
End: 2023-01-06
Payer: MEDICARE

## 2023-01-06 DIAGNOSIS — M75.01 BILATERAL ADHESIVE CAPSULITIS OF SHOULDERS: ICD-10-CM

## 2023-01-06 DIAGNOSIS — M25.512 CHRONIC PAIN OF BOTH SHOULDERS: Primary | ICD-10-CM

## 2023-01-06 DIAGNOSIS — M67.919 TENDINOPATHY OF ROTATOR CUFF, UNSPECIFIED LATERALITY: ICD-10-CM

## 2023-01-06 DIAGNOSIS — M25.511 CHRONIC PAIN OF BOTH SHOULDERS: Primary | ICD-10-CM

## 2023-01-06 DIAGNOSIS — G62.9 PERIPHERAL POLYNEUROPATHY: ICD-10-CM

## 2023-01-06 DIAGNOSIS — M75.40 SHOULDER IMPINGEMENT SYNDROME, UNSPECIFIED LATERALITY: ICD-10-CM

## 2023-01-06 DIAGNOSIS — M75.02 BILATERAL ADHESIVE CAPSULITIS OF SHOULDERS: ICD-10-CM

## 2023-01-06 DIAGNOSIS — G89.29 CHRONIC PAIN OF BOTH SHOULDERS: Primary | ICD-10-CM

## 2023-01-06 PROCEDURE — 99213 OFFICE O/P EST LOW 20 MIN: CPT | Performed by: PHYSICAL MEDICINE & REHABILITATION

## 2023-01-06 NOTE — PATIENT INSTRUCTIONS
1) Tylenol 500-1000 mg every 6-8 hours as needed for pain. No more than 3000 mg daily. 2) Continue home exercise program and range of motion exercises  3) Continue cardiac rehab as per cardiology  4) Follow up with me as needed.  We can repeat the [facility] injections in the middle of Feb 2023 if needed

## 2023-03-23 ENCOUNTER — OFFICE VISIT (OUTPATIENT)
Dept: PHYSICAL MEDICINE AND REHAB | Facility: CLINIC | Age: 68
End: 2023-03-23
Payer: MEDICARE

## 2023-03-23 ENCOUNTER — TELEPHONE (OUTPATIENT)
Dept: PHYSICAL MEDICINE AND REHAB | Facility: CLINIC | Age: 68
End: 2023-03-23

## 2023-03-23 VITALS — BODY MASS INDEX: 28.29 KG/M2 | WEIGHT: 227.5 LBS | OXYGEN SATURATION: 98 % | HEIGHT: 75 IN | HEART RATE: 86 BPM

## 2023-03-23 DIAGNOSIS — M99.9 BIOMECHANICAL LESION: ICD-10-CM

## 2023-03-23 DIAGNOSIS — M67.919 TENDINOPATHY OF ROTATOR CUFF, UNSPECIFIED LATERALITY: ICD-10-CM

## 2023-03-23 DIAGNOSIS — M75.01 BILATERAL ADHESIVE CAPSULITIS OF SHOULDERS: ICD-10-CM

## 2023-03-23 DIAGNOSIS — M75.02 BILATERAL ADHESIVE CAPSULITIS OF SHOULDERS: ICD-10-CM

## 2023-03-23 DIAGNOSIS — M79.10 MYALGIA: ICD-10-CM

## 2023-03-23 DIAGNOSIS — M25.512 CHRONIC PAIN OF BOTH SHOULDERS: Primary | ICD-10-CM

## 2023-03-23 DIAGNOSIS — M25.511 CHRONIC PAIN OF BOTH SHOULDERS: Primary | ICD-10-CM

## 2023-03-23 DIAGNOSIS — M54.59 LUMBAR TRIGGER POINT SYNDROME: ICD-10-CM

## 2023-03-23 DIAGNOSIS — G89.29 CHRONIC PAIN OF BOTH SHOULDERS: Primary | ICD-10-CM

## 2023-03-23 PROCEDURE — 99214 OFFICE O/P EST MOD 30 MIN: CPT | Performed by: PHYSICAL MEDICINE & REHABILITATION

## 2023-03-23 PROCEDURE — 20611 DRAIN/INJ JOINT/BURSA W/US: CPT | Performed by: PHYSICAL MEDICINE & REHABILITATION

## 2023-03-23 RX ORDER — TRIAMCINOLONE ACETONIDE 40 MG/ML
80 INJECTION, SUSPENSION INTRA-ARTICULAR; INTRAMUSCULAR ONCE
Status: COMPLETED | OUTPATIENT
Start: 2023-03-23 | End: 2023-03-23

## 2023-03-23 RX ORDER — SODIUM CHLORIDE 9 MG/ML
10 INJECTION INTRAVENOUS ONCE
Status: COMPLETED | OUTPATIENT
Start: 2023-03-23 | End: 2023-03-23

## 2023-03-23 RX ORDER — LIDOCAINE HYDROCHLORIDE 10 MG/ML
18 INJECTION, SOLUTION INFILTRATION; PERINEURAL ONCE
Status: COMPLETED | OUTPATIENT
Start: 2023-03-23 | End: 2023-03-23

## 2023-03-23 RX ADMIN — SODIUM CHLORIDE 10 ML: 9 INJECTION INTRAVENOUS at 16:11:00

## 2023-03-23 RX ADMIN — TRIAMCINOLONE ACETONIDE 80 MG: 40 INJECTION, SUSPENSION INTRA-ARTICULAR; INTRAMUSCULAR at 16:14:00

## 2023-03-23 RX ADMIN — LIDOCAINE HYDROCHLORIDE 18 ML: 10 INJECTION, SOLUTION INFILTRATION; PERINEURAL at 16:14:00

## 2023-03-23 NOTE — PATIENT INSTRUCTIONS
1) My office will call you to schedule the BILATERAL [address] CSI under ultrasound guidance once the procedure is approved by your insurance carrier. 2) Please begin physical therapy as soon as possible.  This can  be done with Dr. Krysten Herron ([phone])  3) Follow up in 2 months

## 2023-03-23 NOTE — TELEPHONE ENCOUNTER
Per Medicare Guidelines -no authorization is required for Bilateral [facility] CSI under ultrasound guidance CPT 20611x2,   Status: Authorization is not required however may be subject to review once claim is submitted-Covered Benefit     inj done in office

## 2023-06-13 ENCOUNTER — OFFICE VISIT (OUTPATIENT)
Dept: PHYSICAL MEDICINE AND REHAB | Facility: CLINIC | Age: 68
End: 2023-06-13
Payer: MEDICARE

## 2023-06-13 ENCOUNTER — TELEPHONE (OUTPATIENT)
Dept: PHYSICAL MEDICINE AND REHAB | Facility: CLINIC | Age: 68
End: 2023-06-13

## 2023-06-13 VITALS
DIASTOLIC BLOOD PRESSURE: 70 MMHG | HEIGHT: 75 IN | BODY MASS INDEX: 27.98 KG/M2 | SYSTOLIC BLOOD PRESSURE: 112 MMHG | WEIGHT: 225 LBS

## 2023-06-13 DIAGNOSIS — M25.512 CHRONIC PAIN OF BOTH SHOULDERS: Primary | ICD-10-CM

## 2023-06-13 DIAGNOSIS — M25.511 CHRONIC PAIN OF BOTH SHOULDERS: Primary | ICD-10-CM

## 2023-06-13 DIAGNOSIS — M99.9 BIOMECHANICAL LESION: ICD-10-CM

## 2023-06-13 DIAGNOSIS — M79.10 MYALGIA: ICD-10-CM

## 2023-06-13 DIAGNOSIS — G89.29 CHRONIC PAIN OF BOTH SHOULDERS: Primary | ICD-10-CM

## 2023-06-13 DIAGNOSIS — M67.919 TENDINOPATHY OF ROTATOR CUFF, UNSPECIFIED LATERALITY: ICD-10-CM

## 2023-06-13 DIAGNOSIS — M75.40 SHOULDER IMPINGEMENT SYNDROME, UNSPECIFIED LATERALITY: ICD-10-CM

## 2023-06-13 PROCEDURE — 99214 OFFICE O/P EST MOD 30 MIN: CPT | Performed by: PHYSICAL MEDICINE & REHABILITATION

## 2023-06-13 NOTE — TELEPHONE ENCOUNTER
Per CMS Guidelines -no authorization is required for BILATERAL subacromial CSI under ultrasound guidance CPT 41303-12,     Status: Authorization is not required however may be subject to review once claim is submitted-Covered Benefit     Patient is already scheduled 6/22/23

## 2023-06-13 NOTE — PATIENT INSTRUCTIONS
1) My office will call you to schedule the BILATERAL subacromial CSI under ultrasound guidance once the procedure is approved by your insurance carrier. 2) Tylenol 500-1000 mg every 6-8 hours as needed for pain. No more than 3000 mg daily.   3) Continue home exercise program

## 2023-06-22 ENCOUNTER — OFFICE VISIT (OUTPATIENT)
Dept: PHYSICAL MEDICINE AND REHAB | Facility: CLINIC | Age: 68
End: 2023-06-22
Payer: MEDICARE

## 2023-06-22 DIAGNOSIS — M75.01 BILATERAL ADHESIVE CAPSULITIS OF SHOULDERS: ICD-10-CM

## 2023-06-22 DIAGNOSIS — M67.919 TENDINOPATHY OF ROTATOR CUFF, UNSPECIFIED LATERALITY: ICD-10-CM

## 2023-06-22 DIAGNOSIS — M75.40 SHOULDER IMPINGEMENT SYNDROME, UNSPECIFIED LATERALITY: ICD-10-CM

## 2023-06-22 DIAGNOSIS — M25.512 CHRONIC PAIN OF BOTH SHOULDERS: Primary | ICD-10-CM

## 2023-06-22 DIAGNOSIS — M75.02 BILATERAL ADHESIVE CAPSULITIS OF SHOULDERS: ICD-10-CM

## 2023-06-22 DIAGNOSIS — G89.29 CHRONIC PAIN OF BOTH SHOULDERS: Primary | ICD-10-CM

## 2023-06-22 DIAGNOSIS — M25.511 CHRONIC PAIN OF BOTH SHOULDERS: Primary | ICD-10-CM

## 2023-06-22 RX ORDER — TRIAMCINOLONE ACETONIDE 40 MG/ML
80 INJECTION, SUSPENSION INTRA-ARTICULAR; INTRAMUSCULAR ONCE
Status: COMPLETED | OUTPATIENT
Start: 2023-06-22 | End: 2023-06-22

## 2023-06-22 RX ORDER — LIDOCAINE HYDROCHLORIDE 10 MG/ML
14 INJECTION, SOLUTION INFILTRATION; PERINEURAL ONCE
Status: COMPLETED | OUTPATIENT
Start: 2023-06-22 | End: 2023-06-22

## 2023-06-22 NOTE — PATIENT INSTRUCTIONS
Post Injection Instructions     Please do not do anything strenuous over the next two days (if you had a knee injection do not walk more than 2 city blocks, do not attend any aerobic classes, do not run, no heavy lifting, no prolong standing). You may resume your day to day activities after your injection. You may experience some mild amount of swelling after the procedure. Please ice your joint that was injected at least 5-6 times a day (15 minutes) for two days after (this will help prevent worsening pain that sometimes occurs after an injection). Only take tylenol if needed for pain for the first few days. Watch for signs of infection which include redness, warmth, worsening pain, fevers or chills. If you develop any of these signs call the office immediately at [phone]    Everyone responds differently to injections, but you can expect your peak effects a few weeks after your last injection. Arabella Shukla.  Jj Preciado MD  Physical Medicine and Rehabilitation/Sports Medicine  [facility]

## 2024-01-11 ENCOUNTER — TELEPHONE (OUTPATIENT)
Dept: PHYSICAL MEDICINE AND REHAB | Facility: CLINIC | Age: 69
End: 2024-01-11

## 2024-01-11 ENCOUNTER — OFFICE VISIT (OUTPATIENT)
Dept: PHYSICAL MEDICINE AND REHAB | Facility: CLINIC | Age: 69
End: 2024-01-11
Payer: MEDICARE

## 2024-01-11 DIAGNOSIS — M75.40 SHOULDER IMPINGEMENT SYNDROME, UNSPECIFIED LATERALITY: ICD-10-CM

## 2024-01-11 DIAGNOSIS — G89.29 CHRONIC PAIN OF BOTH SHOULDERS: ICD-10-CM

## 2024-01-11 DIAGNOSIS — M25.512 CHRONIC PAIN OF BOTH SHOULDERS: ICD-10-CM

## 2024-01-11 DIAGNOSIS — M99.9 BIOMECHANICAL LESION: ICD-10-CM

## 2024-01-11 DIAGNOSIS — M54.59 LUMBAR TRIGGER POINT SYNDROME: ICD-10-CM

## 2024-01-11 DIAGNOSIS — M67.919 TENDINOPATHY OF ROTATOR CUFF, UNSPECIFIED LATERALITY: Primary | ICD-10-CM

## 2024-01-11 DIAGNOSIS — G62.9 PERIPHERAL POLYNEUROPATHY: ICD-10-CM

## 2024-01-11 DIAGNOSIS — M25.511 CHRONIC PAIN OF BOTH SHOULDERS: ICD-10-CM

## 2024-01-11 DIAGNOSIS — M79.10 MYALGIA: ICD-10-CM

## 2024-01-11 PROCEDURE — 99214 OFFICE O/P EST MOD 30 MIN: CPT | Performed by: PHYSICAL MEDICINE & REHABILITATION

## 2024-01-11 PROCEDURE — 20610 DRAIN/INJ JOINT/BURSA W/O US: CPT | Performed by: PHYSICAL MEDICINE & REHABILITATION

## 2024-01-11 RX ORDER — LIDOCAINE HYDROCHLORIDE 10 MG/ML
14 INJECTION, SOLUTION INFILTRATION; PERINEURAL ONCE
Status: COMPLETED | OUTPATIENT
Start: 2024-01-11 | End: 2024-01-11

## 2024-01-11 RX ORDER — TRIAMCINOLONE ACETONIDE 40 MG/ML
80 INJECTION, SUSPENSION INTRA-ARTICULAR; INTRAMUSCULAR ONCE
Status: COMPLETED | OUTPATIENT
Start: 2024-01-11 | End: 2024-01-11

## 2024-01-11 RX ADMIN — TRIAMCINOLONE ACETONIDE 80 MG: 40 INJECTION, SUSPENSION INTRA-ARTICULAR; INTRAMUSCULAR at 15:33:00

## 2024-01-11 RX ADMIN — LIDOCAINE HYDROCHLORIDE 14 ML: 10 INJECTION, SOLUTION INFILTRATION; PERINEURAL at 15:33:00

## 2024-01-11 NOTE — TELEPHONE ENCOUNTER
Per CMS Guidelines -no authorization is required for BILATERAL subacromial CSI CPT 47911-13,     Status: Authorization is not required based on medical necessity however may be subject to review once claim is submitted-Covered Benefit     Inj done in office

## 2024-01-11 NOTE — PROCEDURES
[facility]   Shoulder Injection Procedure Note    CHIEF COMPLAINT:    Chief Complaint   Patient presents with    Shoulder Pain     LOV: 6/22/23 Patient f/u on Bilateral subacromial space  corticosteroid injection under ultrasound guidance with 60-70% improvement for 3-4 months. C/o bilateral shoulder pain, localized. Rates pain 8/10.        PROCEDURE PERFORMED:  Bilateral subacromial corticosteroid injection     INDICATIONS:  Bilateral shoulder pain    PRIMARY PROCEDURALIST:  Alex Behar, MD    INFORMED CONSENT & TIME OUT:   As documented in the Time Out and Pre-Procedure Check Lists.  Verbal consent was obtained    Vitals: [unfilled]  Labs (document last wbc, plts, hgb, and PT/INR):   No visits with results within 6 Month(s) from this visit.   Latest known visit with results is:   [facility] Lab Encounter on 11/07/2021   Component Date Value Ref Range Status    SARS-CoV-2 (Alinity) 11/07/2021 Not Detected  Not Detected Final   ]    PROCEDURE:    The risks and benefits of intraarticular corticosteroid injection were again explained to the patient and verbal consent was obtained. The Bilateral shoulder was prepped and draped in the usual sterile fashion. A 25 G 1.5-inch needle was introduced into the Bilateral subacromial space in a posterolateral approach while injecting 1.5 cc of 1% lidocain. Aspiration was attempted and there was no fluid able to be aspirated. We switched the syringe to one containing 4 mL of 1% lidocaine and 1 mL of 40 mg/mL Kenalog and it was injected.  The needle was then removed and hemostasis was obtained.  The skin site was cleansed and a clean dressing was applied.  The patient tolerated the procedure well.     Patient verbalized understanding of assessment and plan.  Patient is in agreement with the plan.  All questions were answered.  No barriers to learning identified.       INSTRUCTIONS GIVEN TO PATIENT:    \"You will see an effect in the next 2-3  days.  Please contact me if you have fevers, worsening swelling, worsening pain, decreased range of motion, increased redness, chills, or anything that makes you concerned about how the joint we injected feels/looks.  If you do not reach me in a reasonable time, please report directly to the emergency room for further evaluation\"      Alex B. Behar MD, [facility]  Physical Medicine and Rehabilitation/Sports Medicine  [facility]

## 2024-01-11 NOTE — PATIENT INSTRUCTIONS
1) My office will call you to schedule the BILATERAL subacromial CSI once the procedure is approved by your insurance carrier.    2) Tylenol 500-1000 mg every 6-8 hours as needed for pain.  No more than 3000 mg daily.  3) Follow up with me in 3 months    Post Injection Instructions     Please do not do anything strenuous over the next two days (if you had a knee injection do not walk more than 2 city blocks, do not attend any aerobic classes, do not run, no heavy lifting, no prolong standing).  You may resume your day to day activities after your injection.  You may experience some mild amount of swelling after the procedure.  Please ice your joint that was injected at least 5-6 times a day (15 minutes) for two days after (this will help prevent worsening pain that sometimes occurs after an injection).  Only take tylenol if needed for pain for the first few days.  Watch for signs of infection which include redness, warmth, worsening pain, fevers or chills.  If you develop any of these signs call the office immediately at [phone]    Everyone responds differently to injections, but you can expect your peak effects a few weeks after your last injection.  Alex B. Behar MD  Physical Medicine and Rehabilitation/Sports Medicine  [facility]

## 2024-01-11 NOTE — PROGRESS NOTES
[facility]  Progress Note    CHIEF COMPLAINT:    Chief Complaint   Patient presents with    Shoulder Pain     LOV: 6/22/23 Patient f/u on Bilateral subacromial space  corticosteroid injection under ultrasound guidance with 60-70% improvement for 3-4 months. C/o bilateral shoulder pain, localized. Rates pain 8/10.        History of Present Illness:  The patient is a 68 year old right-handed male with a significant past medical history of hyperlipidemia and recent heart failure following a ablation for atrial flutter leading to pacemaker placement who presents with bilateral shoulder pain.  He is status post bilateral subacromial injection on 6/22/2023 where he had 60 to 80% improvement and was able to play golf 3 times per week throughout the summer.  His symptoms have since returned and rates his shoulder pain an 8 out of 10 during abduction bilaterally.  He denies any radicular symptoms.  He has been compliant with a home exercise program and has been doing strength training with bands.    PAST MEDICAL HISTORY:  History reviewed. No pertinent past medical history.    SURGICAL HISTORY:  History reviewed. No pertinent surgical history.    SOCIAL HISTORY:   Social History     Occupational History    Not on file   Tobacco Use    Smoking status: Never    Smokeless tobacco: Never   Substance and Sexual Activity    Alcohol use: Not Currently    Drug use: Not Currently    Sexual activity: Not on file       FAMILY HISTORY:   History reviewed. No pertinent family history.    CURRENT MEDICATIONS:   Current Outpatient Medications   Medication Sig Dispense Refill    sacubitril-valsartan (ENTRESTO) 24-26 MG Oral Tab Entresto 24 mg-26 mg tablet   Take 1 Tab by mouth two times per day.      FARXIGA 10 MG Oral Tab Take 1 tablet (10 mg total) by mouth daily.      XARELTO 20 MG Oral Tab Take 1 tablet (20 mg total) by mouth daily with dinner.      spironolactone 25 MG Oral Tab Take 1  tablet (25 mg total) by mouth daily with breakfast.      carvedilol 25 MG Oral Tab Take 1 Tablet by mouth two times per day.      atorvastatin 40 MG Oral Tab Take 1 tablet (40 mg total) by mouth nightly.      latanoprost 0.005 % Ophthalmic Solution Apply to eye.      ALPRAZolam 0.5 MG Oral Tab Take 1 tablet (0.5 mg total) by mouth daily as needed.      Cholecalciferol 50 MCG (2000 UT) Oral Cap Take by mouth.      clonazePAM 0.5 MG Oral Tab clonazepam 0.5 mg tablet   TAKE ONE TABLET BY MOUTH THREE TIMES DAILY      folic acid 1 MG Oral Tab folic acid 1 mg tablet   Take 1 tablet(s) every day by oral route for 90 days.      Levothyroxine Sodium 75 MCG Oral Tab levothyroxine 75 mcg tablet   Take 1 tablet every day by oral route for 90 days.      Omega-3 Fatty Acids (FISH OIL) 1200 MG Oral Cap Take by mouth.         ALLERGIES:   No Known Allergies    REVIEW OF SYSTEMS:   Review of Systems   Constitutional: Negative.    HENT: Negative.    Eyes: Negative.    Respiratory: Negative.    Cardiovascular: Negative.    Gastrointestinal: Negative.    Genitourinary: Negative.    Musculoskeletal: As per HPI  Skin: Negative.    Neurological: As per HPI  Endo/Heme/Allergies: Negative.    Psychiatric/Behavioral: Negative.      All other systems reviewed and are negative. Pertinent positives and negatives noted in the HPI.        PHYSICAL EXAM:   There were no vitals taken for this visit.    There is no height or weight on file to calculate BMI.      General: No immediate distress  Head: Normocephalic/ Atraumatic  Eyes: Extra-occular movements intact.   Ears: No auricular hematoma or deformities  Mouth: No lesions or ulcerations  Heart: peripheral pulses intact. Normal capillary refill.   Lungs: Non-labored respirations  Abdomen: No abdominal guarding  Extremities: No lower extremity edema bilaterally   Skin: No lesions noted.   Cognition: alert & oriented x 3, attentive, able to follow 2 step commands, comprehention intact, spontaneous  speech intact  Motor:    Musculoskeletal:      Data  No visits with results within 6 Month(s) from this visit.   Latest known visit with results is:   [facility]   Component Date Value Ref Range Status    SARS-CoV-2 (Alinity) 11/07/2021 Not Detected  Not Detected Final   ]      Radiology Imaging:  I reviewed with the patient his MRI of the shoulder bilateral from 12/28/2022  MRI SHOULDER, LEFT (CPT=73221)  Narrative: PROCEDURE: MRI SHOULDER, LEFT (CPT=73221)     COMPARISON: None.     INDICATIONS: Left shoulder pain.  M75.40 Shoulder impingement syndrome, unspecified laterality M67.919 Tendinopathy of rotator cuff, unspecified laterality M25.512 Chronic pain of both shoulder*     TECHNIQUE: A variety of imaging planes and parameters were utilized for visualization of suspected pathology.  Images were performed without contrast.       FINDINGS:     ROTATOR CUFF:   * Supraspinatus and infraspinatus tendinosis.  No discrete rotator cuff tear.  * Muscle-tendon units of rotator cuff are otherwise normal.     A.C. JOINT:   * Type III acromion.   * Moderate AC joint osteoarthritis with hypertrophy of clavicular head contributing to narrowing of supraspinatus outlet.     GLENOHUMERAL JOINT, LABRUM, BICEPS  * Glenohumeral joint intact.   * Degenerative fraying of the superior labrum.    * Mild tendinosis long head biceps tendon.     OTHER: Negative.           Impression: CONCLUSION:   1. Supraspinatus and infraspinatus tendinosis without tear.  2. Moderate AC joint osteoarthritis with hypertrophy of clavicular head and type 3 acromion narrowing supraspinatus outlet.  3. Mild long head biceps tendinosis.           Dictated by (CST): Blayne Ramírez MD on 12/20/2022 at 3:47 PM       Finalized by (CST): Blayne Ramírez MD on 12/20/2022 at 3:51 PM          MRI SHOULDER, RIGHT (GHB=[id number])  Narrative: PROCEDURE: MRI SHOULDER, RIGHT (CPT=73221)     COMPARISON: None.     INDICATIONS: Right shoulder pain.  M75.40  Shoulder impingement syndrome, unspecified laterality M67.919 Tendinopathy of rotator cuff, unspecified laterality M25.512 Chronic pain of both shoulder*     TECHNIQUE: A variety of imaging planes and parameters were utilized for visualization of suspected pathology.  Images were performed without contrast.       FINDINGS:  Evaluation is slightly degraded by patient-related motion artifact.      ROTATOR CUFF:   * Supraspinatus tendinosis with a tiny low-grade interstitial partial tear at greater tuberosity insertion  * Mild infraspinatus tendinosis.    * Muscle-tendon units of the rotator cuff are otherwise normal.     A.C. JOINT:   * Type III acromion.   * Mild-to-moderate AC joint degenerative change with clavicular head hypertrophy contributing to narrowing of supraspinatus outlet.     GLENOHUMERAL JOINT, LABRUM, BICEPS  * Glenohumeral joint intact.   * Degenerative fraying of the superior labrum.    * Long head biceps tendon intact with mild tenosynovitis.     OTHER: Negative.           Impression: CONCLUSION:   1. Supraspinatus and infraspinatus tendinosis.  Tiny low-grade interstitial partial tear at supraspinatus greater tuberosity tendon insertion.  No full-thickness rotator cuff tear.  2. Mild long head biceps tenosynovitis.  3. Type 3 acromion.  Mild-moderate AC joint osteoarthritis with clavicular head hypertrophy narrowing supraspinatus outlet.           Dictated by (CST): Blayne Ramírez MD on 12/20/2022 at 3:40 PM       Finalized by (CST): Blayne Ramírez MD on 12/20/2022 at 3:47 PM              ASSESSMENT AND PLAN:  Trent is a pleasant 68-year-old male who presents for follow-up of his bilateral shoulder pain due to subacromial bursitis and rotator cuff tendinopathy stemming from his type III acromion.  I am recommending bilateral subacromial injections and continued home exercise program focusing on scapular stabilizing exercises and rotator cuff strengthening exercises.  He will follow-up with me as  needed.       RTC in 3 months as needed  Discharge Instructions were provided as documented in AVS summary.  The patient was in agreement with the assessment and plan.  All questions were answered.  There were no barriers to learning.         1. Tendinopathy of rotator cuff, unspecified laterality    2. Chronic pain of both shoulders    3. Shoulder impingement syndrome, unspecified laterality    4. Peripheral polyneuropathy    5. Lumbar trigger point syndrome    6. Myalgia    7. Biomechanical lesion        Alex B. Behar MD  Physical Medicine and Rehabilitation/Sports Medicine  [facility]

## 2024-02-15 NOTE — TELEPHONE ENCOUNTER
Left hip injection done in office.
Patient has been scheduled for RIGHt L3-L4, L4-L5, aqnd L5-S1 MBBs  on 12/8/21 at the [facility] with Dr.Behar.   -Anesthesia type: Local.  -If receiving MAC or IVC sedation patient will need to get COVID tested 3 days prior even if already vaccinated (order noemi
Per Medicare guidelines authorization is not required for RIGHt L3-L4, L4-L5, aqnd L5-S1 MBBs cpt codes 82954-IZ, [id number]. [id number], [id number]. Will inform Nursing.
Per Medicare guidelines-no authorization required for Lidocaine/Kenalog injections in office     LEFT hip CSI under ultrasound guidance   CPT 19090+    Status: Approved-Covered Benefit    Routing to clinical staff to schedule
Statement Selected

## 2024-09-27 NOTE — TELEPHONE ENCOUNTER
Increase Pravastatin to 40mg tab, 2 tabs (total 80mg) daily in PM until ruin out. Then change to 80mg tab, 1 tab daily for cholesterol management  Continue other medications  Prescriptions refilled at your pharmacy.    Call  [phone] or use [facility] to schedule a future lab appointment  fasting in 4 weeks before going to [place] for cholesterol and repeat nonfasting A1C lab in Spring 2025 when return to [place].   For fasting labs, please refrain from eating for 8 hours or more.   Drink 2 glasses of water before your lab appointment. It is fine to take your  oral medications on the morning of the lab test as usual   Eye exam appointment in October as scheduled.   I would recommend  a  Flu vaccine (influenza risk reduction) and Updated covid booster vaccine in early October. Would recommend RSV vaccine in late October. Get at a pharmacy  Get a tetanus (Td) vaccine in November or December in [place] for booster at a pharmacy     [facility]      PSR- 2 weeks.  OK for virtual.

## 2024-11-12 ENCOUNTER — OFFICE VISIT (OUTPATIENT)
Dept: PHYSICAL MEDICINE AND REHAB | Facility: CLINIC | Age: 69
End: 2024-11-12
Payer: MEDICARE

## 2024-11-12 ENCOUNTER — TELEPHONE (OUTPATIENT)
Dept: PHYSICAL MEDICINE AND REHAB | Facility: CLINIC | Age: 69
End: 2024-11-12

## 2024-11-12 DIAGNOSIS — M25.511 CHRONIC PAIN OF BOTH SHOULDERS: ICD-10-CM

## 2024-11-12 DIAGNOSIS — M75.40 SHOULDER IMPINGEMENT SYNDROME, UNSPECIFIED LATERALITY: ICD-10-CM

## 2024-11-12 DIAGNOSIS — M67.919 TENDINOPATHY OF ROTATOR CUFF, UNSPECIFIED LATERALITY: Primary | ICD-10-CM

## 2024-11-12 DIAGNOSIS — M25.512 CHRONIC PAIN OF BOTH SHOULDERS: ICD-10-CM

## 2024-11-12 DIAGNOSIS — G89.29 CHRONIC PAIN OF BOTH SHOULDERS: ICD-10-CM

## 2024-11-12 PROCEDURE — 20610 DRAIN/INJ JOINT/BURSA W/O US: CPT | Performed by: PHYSICAL MEDICINE & REHABILITATION

## 2024-11-12 PROCEDURE — 99214 OFFICE O/P EST MOD 30 MIN: CPT | Performed by: PHYSICAL MEDICINE & REHABILITATION

## 2024-11-12 RX ORDER — LIDOCAINE HYDROCHLORIDE 10 MG/ML
14 INJECTION, SOLUTION INFILTRATION; PERINEURAL ONCE
Status: COMPLETED | OUTPATIENT
Start: 2024-11-12 | End: 2024-11-12

## 2024-11-12 RX ORDER — TRIAMCINOLONE ACETONIDE 40 MG/ML
80 INJECTION, SUSPENSION INTRA-ARTICULAR; INTRAMUSCULAR ONCE
Status: COMPLETED | OUTPATIENT
Start: 2024-11-12 | End: 2024-11-12

## 2024-11-12 NOTE — PROGRESS NOTES
[facility]  Progress Note    CHIEF COMPLAINT:    Chief Complaint   Patient presents with    Injection     BILATERAL subacromial CSI          History of Present Illness:  The patient is a 69 year old  right-handed male with a significant past medical history of hyperlipidemia and recent heart failure following a ablation for atrial flutter leading to pacemaker placement who presents with bilateral shoulder pain.  He is status post bilateral subacromial injection on 1/11/2024.  He has been doing very well and has been able to play 60 rounds of golf this summer.  He rates his discomfort as high as a 5 out of 10 when he is abducting both shoulders.  He denies any radicular symptoms.  He would like to repeat the subacromial injections as he is leaving to [place].    PAST MEDICAL HISTORY:  History reviewed. No pertinent past medical history.    SURGICAL HISTORY:  History reviewed. No pertinent surgical history.    SOCIAL HISTORY:   Social History     Occupational History    Not on file   Tobacco Use    Smoking status: Never    Smokeless tobacco: Never   Substance and Sexual Activity    Alcohol use: Not Currently    Drug use: Not Currently    Sexual activity: Not on file       FAMILY HISTORY:   History reviewed. No pertinent family history.    CURRENT MEDICATIONS:   Current Outpatient Medications   Medication Sig Dispense Refill    sacubitril-valsartan (ENTRESTO) 24-26 MG Oral Tab Entresto 24 mg-26 mg tablet   Take 1 Tab by mouth two times per day.      FARXIGA 10 MG Oral Tab Take 1 tablet (10 mg total) by mouth daily.      XARELTO 20 MG Oral Tab Take 1 tablet (20 mg total) by mouth daily with dinner.      spironolactone 25 MG Oral Tab Take 1 tablet (25 mg total) by mouth daily with breakfast.      carvedilol 25 MG Oral Tab Take 1 Tablet by mouth two times per day.      atorvastatin 40 MG Oral Tab Take 1 tablet (40 mg total) by mouth nightly.      latanoprost 0.005 %  Ophthalmic Solution Apply to eye.      ALPRAZolam 0.5 MG Oral Tab Take 1 tablet (0.5 mg total) by mouth daily as needed.      Cholecalciferol 50 MCG (2000 UT) Oral Cap Take by mouth.      clonazePAM 0.5 MG Oral Tab clonazepam 0.5 mg tablet   TAKE ONE TABLET BY MOUTH THREE TIMES DAILY      folic acid 1 MG Oral Tab folic acid 1 mg tablet   Take 1 tablet(s) every day by oral route for 90 days.      Levothyroxine Sodium 75 MCG Oral Tab levothyroxine 75 mcg tablet   Take 1 tablet every day by oral route for 90 days.      Omega-3 Fatty Acids (FISH OIL) 1200 MG Oral Cap Take by mouth.         ALLERGIES:   Allergies[1]    REVIEW OF SYSTEMS:   Review of Systems   Constitutional: Negative.    HENT: Negative.    Eyes: Negative.    Respiratory: Negative.    Cardiovascular: Negative.    Gastrointestinal: Negative.    Genitourinary: Negative.    Musculoskeletal: As per HPI  Skin: Negative.    Neurological: As per HPI  Endo/Heme/Allergies: Negative.    Psychiatric/Behavioral: Negative.      All other systems reviewed and are negative. Pertinent positives and negatives noted in the HPI.        PHYSICAL EXAM:   There were no vitals taken for this visit.    There is no height or weight on file to calculate BMI.      General: No immediate distress  Head: Normocephalic/ Atraumatic  Eyes: Extra-occular movements intact.   Ears: No auricular hematoma or deformities  Mouth: No lesions or ulcerations  Heart: peripheral pulses intact. Normal capillary refill.   Lungs: Non-labored respirations  Abdomen: No abdominal guarding  Extremities: No lower extremity edema bilaterally   Skin: No lesions noted.   Cognition: alert & oriented x 3, attentive, able to follow 2 step commands, comprehention intact, spontaneous speech intact  Motor:    Musculoskeletal:        Data  No visits with results within 6 Month(s) from this visit.   Latest known visit with results is:   [facility] Lab Encounter on 11/07/2021   Component Date Value Ref Range Status     SARS-CoV-2 (Alinity) 11/07/2021 Not Detected  Not Detected Final   ]      Radiology Imaging:  I reviewed with the patient his MRI of the shoulder bilateral   MRI SHOULDER, LEFT (CPT=73221)  Narrative: PROCEDURE: MRI SHOULDER, LEFT (CPT=73221)     COMPARISON: None.     INDICATIONS: Left shoulder pain.  M75.40 Shoulder impingement syndrome, unspecified laterality M67.919 Tendinopathy of rotator cuff, unspecified laterality M25.512 Chronic pain of both shoulder*     TECHNIQUE: A variety of imaging planes and parameters were utilized for visualization of suspected pathology.  Images were performed without contrast.       FINDINGS:     ROTATOR CUFF:   * Supraspinatus and infraspinatus tendinosis.  No discrete rotator cuff tear.  * Muscle-tendon units of rotator cuff are otherwise normal.     A.C. JOINT:   * Type III acromion.   * Moderate AC joint osteoarthritis with hypertrophy of clavicular head contributing to narrowing of supraspinatus outlet.     GLENOHUMERAL JOINT, LABRUM, BICEPS  * Glenohumeral joint intact.   * Degenerative fraying of the superior labrum.    * Mild tendinosis long head biceps tendon.     OTHER: Negative.           Impression: CONCLUSION:   1. Supraspinatus and infraspinatus tendinosis without tear.  2. Moderate AC joint osteoarthritis with hypertrophy of clavicular head and type 3 acromion narrowing supraspinatus outlet.  3. Mild long head biceps tendinosis.           Dictated by (CST): Blayne Ramírez MD on 12/20/2022 at 3:47 PM       Finalized by (CST): Blayne Ramírez MD on 12/20/2022 at 3:51 PM          MRI SHOULDER, RIGHT (IFC=[id number])  Narrative: PROCEDURE: MRI SHOULDER, RIGHT (CPT=73221)     COMPARISON: None.     INDICATIONS: Right shoulder pain.  M75.40 Shoulder impingement syndrome, unspecified laterality M67.919 Tendinopathy of rotator cuff, unspecified laterality M25.512 Chronic pain of both shoulder*     TECHNIQUE: A variety of imaging planes and parameters were utilized for visualization  of suspected pathology.  Images were performed without contrast.       FINDINGS:  Evaluation is slightly degraded by patient-related motion artifact.      ROTATOR CUFF:   * Supraspinatus tendinosis with a tiny low-grade interstitial partial tear at greater tuberosity insertion  * Mild infraspinatus tendinosis.    * Muscle-tendon units of the rotator cuff are otherwise normal.     A.C. JOINT:   * Type III acromion.   * Mild-to-moderate AC joint degenerative change with clavicular head hypertrophy contributing to narrowing of supraspinatus outlet.     GLENOHUMERAL JOINT, LABRUM, BICEPS  * Glenohumeral joint intact.   * Degenerative fraying of the superior labrum.    * Long head biceps tendon intact with mild tenosynovitis.     OTHER: Negative.           Impression: CONCLUSION:   1. Supraspinatus and infraspinatus tendinosis.  Tiny low-grade interstitial partial tear at supraspinatus greater tuberosity tendon insertion.  No full-thickness rotator cuff tear.  2. Mild long head biceps tenosynovitis.  3. Type 3 acromion.  Mild-moderate AC joint osteoarthritis with clavicular head hypertrophy narrowing supraspinatus outlet.           Dictated by (CST): Blayne Ramírez MD on 12/20/2022 at 3:40 PM       Finalized by (CST): Blayne Ramírez MD on 12/20/2022 at 3:47 PM              ASSESSMENT AND PLAN:  Trent is a pleasant 69-year-old male who presents for follow-up of his bilateral shoulder pain due to rotator cuff tendinopathy with impingement syndrome.  I am recommending he continue with a home exercise program and use Tylenol as needed for pain.  I have also recommended bilateral subacromial injections which we performed today.  Please see separate procedure note.  He will follow-up with me in about 6 months I am not recommending NSAIDs due to his cardiac comorbidities.       RTC in 6 months  Discharge Instructions were provided as documented in AVS summary.  The patient was in agreement with the assessment and plan.  All  questions were answered.  There were no barriers to learning.         1. Tendinopathy of rotator cuff, unspecified laterality    2. Chronic pain of both shoulders    3. Shoulder impingement syndrome, unspecified laterality        Alex B. Behar MD  Physical Medicine and Rehabilitation/Sports Medicine  [facility]         [1] No Known Allergies

## 2024-11-12 NOTE — PROCEDURES
[facility]   Shoulder Injection Procedure Note    CHIEF COMPLAINT:    Chief Complaint   Patient presents with    Injection     BILATERAL subacromial CSI          PROCEDURE PERFORMED:  Bilateral subacromial corticosteroid injection     INDICATIONS:  Bilateral shoulder pain    PRIMARY PROCEDURALIST:  Alex Behar, MD    INFORMED CONSENT & TIME OUT:   As documented in the Time Out and Pre-Procedure Check Lists.  Verbal consent was obtained    Vitals: [unfilled]  Labs (document last wbc, plts, hgb, and PT/INR):   No visits with results within 6 Month(s) from this visit.   Latest known visit with results is:   [facility] Lab Encounter on 11/07/2021   Component Date Value Ref Range Status    SARS-CoV-2 (Alinity) 11/07/2021 Not Detected  Not Detected Final   ]    PROCEDURE:    The risks and benefits of intraarticular corticosteroid injection were again explained to the patient and verbal consent was obtained. The Bilateral shoulder was prepped and draped in the usual sterile fashion. A 25 G 1.5-inch needle was introduced into the Bilateral subacromial space in a posterolateral approach while injecting 1.5 cc of 1% lidocain. Aspiration was attempted and there was no fluid able to be aspirated. We switched the syringe to one containing 4 mL of 1% lidocaine and 1 mL of 40 mg/mL Kenalog and it was injected.  The needle was then removed and hemostasis was obtained.  The skin site was cleansed and a clean dressing was applied.  The patient tolerated the procedure well.   Patient verbalized understanding of assessment and plan.  Patient is in agreement with the plan.  All questions were answered.  No barriers to learning identified.       INSTRUCTIONS GIVEN TO PATIENT:    \"You will see an effect in the next 2-3 days.  Please contact me if you have fevers, worsening swelling, worsening pain, decreased range of motion, increased redness, chills, or anything that makes you concerned about how the  joint we injected feels/looks.  If you do not reach me in a reasonable time, please report directly to the emergency room for further evaluation\"      Alex B. Behar MD, [facility] & [facility]  Physical Medicine and Rehabilitation/Sports Medicine  [facility]

## 2024-11-12 NOTE — TELEPHONE ENCOUNTER
Per CMS Guidelines -no authorization is required for Bilateral subacromial corticosteroid injections   CPT codes: 90836-21,  x's 2  Completed in the office today       Status: Authorization is not required based on medical necessity however is not a guarantee of payment and may be subject to review once claim is submitted-Covered Benefit.

## 2024-11-12 NOTE — PATIENT INSTRUCTIONS
Post Injection Instructions     Please do not do anything strenuous over the next two days (if you had a knee injection do not walk more than 2 city blocks, do not attend any aerobic classes, do not run, no heavy lifting, no prolong standing).  You may resume your day to day activities after your injection.  You may experience some mild amount of swelling after the procedure.  Please ice your joint that was injected at least 5-6 times a day (15 minutes) for two days after (this will help prevent worsening pain that sometimes occurs after an injection).  Only take tylenol if needed for pain for the first few days.  Watch for signs of infection which include redness, warmth, worsening pain, fevers or chills.  If you develop any of these signs call the office immediately at [phone]    Everyone responds differently to injections, but you can expect your peak effects a few weeks after your last injection.  Alex B. Behar MD  Physical Medicine and Rehabilitation/Sports Medicine  [facility]

## (undated) NOTE — LETTER
AUTHORIZATION FOR SURGICAL OPERATION OR OTHER PROCEDURE    1. I hereby authorize Dr. Harpreet Webb  and the [facility] Office staff assigned to my case to perform the following operation and/or procedure at the [facility] Office:    LEFT hip CSI under ultrasound guidance   2. signature below affirms that prior to the time of the procedure, I have explained to the patient and/or his/her guardian, the risks and benefits involved in the proposed treatment and any reasonable alternative to the proposed treatment.   I have also expla

## (undated) NOTE — LETTER
AUTHORIZATION FOR SURGICAL OPERATION OR OTHER PROCEDURE    1. I hereby authorize Dr. Deepa Garcia and the [facility] Office staff assigned to my case to perform the following operation and/or procedure at the [facility] Office:      Bilateral [facility] under ultrasound guidance    2. My physician has explained the nature and purpose of the operation or other procedure, possible alternative methods of treatment, the risks involved, and the possibility of complication to me. I acknowledge that no guarantee has been made as to the result that may be obtained. 3.  I recognize that, during the course of this operation, or other procedure, unforseen conditions may necessitate additional or different procedure than those listed above. I, therefore, further authorize and request that the above named physician, his/her physician assistants or designees perform such procedures as are, in his/her professional opinion, necessary and desirable. 4.  Any tissue or organs removed in the operation or other procedure may be disposed of by and at the discretion of the [facility] Office staff and [facility] AT [facility]. 5.  I understand that in the event of a medical emergency, I will be transported by local paramedics to [facility] or other hospital emergency department. 6.  I certify that I have read and fully understand the above consent to operation and/or other procedure. 7.  I acknowledge that my physician has explained sedation/analgesia administration to me including the risks and benefits. I consent to the administration of sedation/analgesia as may be necessary or desirable in the judgement of my physician. Witness signature: ___________________________________________________ Date:  ______/______/_____                    Time:  ________ A. M.  P.M.        Patient Olvin Cordero  3/26/1955  [id number]       Patient signature:  ___________________________________________________               Statement of Physician  My signature below affirms that prior to the time of the procedure, I have explained to the patient and/or his/her guardian, the risks and benefits involved in the proposed treatment and any reasonable alternative to the proposed treatment. I have also explained the risks and benefits involved in the refusal of the proposed treatment and have answered the patient's questions.                         Date:  ______/______/_______  Provider                      Signature:  __________________________________________________________       Time:  ___________ A.M    P.M.

## (undated) NOTE — LETTER
AUTHORIZATION FOR SURGICAL OPERATION OR OTHER PROCEDURE    1.  I hereby authorize Dr Zonia Benoit and the [facility] Office staff assigned to my case to perform the following operation and/or procedure at the [facility] Office:    LEFT  hip CSI under ultrasound guidance ___________________________________________________             Relationship to Patient:           []  Parent    Responsible person                          []  Spouse  In case of minor or                    [] Other  _____________   Incompetent name:  ___

## (undated) NOTE — LETTER
[address]  [address]  [place] [address]  [phone]        Dear Dr. Sanchez Fearing,      I had the pleasure of seeing your patient, Zia Sandoval on 9/30/2021. Below please find a summary of our visit. trouble with tandem gait. Romberg sign is trace positive. No Babinski signs. Impression: Sensory neuropathy. I did relate to him that Neurontin will not help with the numbness. On May try other medications for the paresthesias, dysesthesias.   The ot

## (undated) NOTE — LETTER
AUTHORIZATION FOR SURGICAL OPERATION OR OTHER PROCEDURE    1. I hereby authorize Dr. Maryann Amaro and the [facility] Office staff assigned to my case to perform the following operation and/or procedure at the [facility] Office:    BILATERAL subacromial CSI under ultrasound guidance    2. My physician has explained the nature and purpose of the operation or other procedure, possible alternative methods of treatment, the risks involved, and the possibility of complication to me. I acknowledge that no guarantee has been made as to the result that may be obtained. 3.  I recognize that, during the course of this operation, or other procedure, unforseen conditions may necessitate additional or different procedure than those listed above. I, therefore, further authorize and request that the above named physician, his/her physician assistants or designees perform such procedures as are, in his/her professional opinion, necessary and desirable. 4.  Any tissue or organs removed in the operation or other procedure may be disposed of by and at the discretion of the [facility] Office staff and [facility] AT [facility]. 5.  I understand that in the event of a medical emergency, I will be transported by local paramedics to [facility] or other hospital emergency department. 6.  I certify that I have read and fully understand the above consent to operation and/or other procedure. 7.  I acknowledge that my physician has explained sedation/analgesia administration to me including the risks and benefits. I consent to the administration of sedation/analgesia as may be necessary or desirable in the judgement of my physician. Witness signature: ___________________________________________________ Date:  ______/______/_____                    Time:  ________ A. M.  P.M.        Patient Name:  Eli Hitchcock  3/26/1955  [id number]         Patient signature:  ___________________________________________________               Statement of Physician  My signature below affirms that prior to the time of the procedure, I have explained to the patient and/or his/her guardian, the risks and benefits involved in the proposed treatment and any reasonable alternative to the proposed treatment. I have also explained the risks and benefits involved in the refusal of the proposed treatment and have answered the patient's questions.                         Date:  ______/______/_______  Provider                      Signature:  __________________________________________________________       Time:  ___________ A.M    P.M.

## (undated) NOTE — LETTER
AUTHORIZATION FOR SURGICAL OPERATION OR OTHER PROCEDURE    1. I hereby authorize Dr. Alex Behar and the [facility] Office staff assigned to my case to perform the following operation and/or procedure at the [facility] Office:    Bilateral subacromial injection    2.  My physician has explained the nature and purpose of the operation or other procedure, possible alternative methods of treatment, the risks involved, and the possibility of complication to me.  I acknowledge that no guarantee has been made as to the result that may be obtained.  3.  I recognize that, during the course of this operation, or other procedure, unforseen conditions may necessitate additional or different procedure than those listed above.  I, therefore, further authorize and request that the above named physician, his/her physician assistants or designees perform such procedures as are, in his/her professional opinion, necessary and desirable.  4.  Any tissue or organs removed in the operation or other procedure may be disposed of by and at the discretion of the [facility] Office staff and [facility].  5.  I understand that in the event of a medical emergency, I will be transported by local paramedics to [facility] or other hospital emergency department.  6.  I certify that I have read and fully understand the above consent to operation and/or other procedure.    7.  I acknowledge that my physician has explained sedation/analgesia administration to me including the risks and benefits.  I consent to the administration of sedation/analgesia as may be necessary or desirable in the judgement of my physician.    Witness signature: ___________________________________________________ Date:  ______/______/_____                    Time:  ________ A.M.  P.M.       Patient Name:  Trent Goff  3/26/1955  [id number]         Patient signature:  ___________________________________________________      Statement of Physician  My  signature below affirms that prior to the time of the procedure, I have explained to the patient and/or his/her guardian, the risks and benefits involved in the proposed treatment and any reasonable alternative to the proposed treatment.  I have also explained the risks and benefits involved in the refusal of the proposed treatment and have answered the patient's questions.                        Date:  ______/______/_______  Provider                      Signature:  __________________________________________________________       Time:  ___________ A.M    P.M.

## (undated) NOTE — LETTER
PIOTR Notifier: [email or web address]   SUKHJINDER. Patient Name: Bea Miranda Identification Number: [id number]      Advance Beneficiary Notice of Noncoverage (ABN)  NOTE:  If Medicare doesn’t pay for D. Item/service(s) below, you may have to pay.   Medicare does deductibles. ? OPTION 2. I want the D. Item/service(s) listed above, but do not bill Medicare. You may ask to be paid now as I am responsible for payment. I cannot appeal if Medicare is not billed. ? OPTION 3. I don’t want the D.  Item/service(s) listed

## (undated) NOTE — LETTER
AUTHORIZATION FOR SURGICAL OPERATION OR OTHER PROCEDURE    1. I hereby authorize Dr. Nayely Mcadams and the [facility] Office staff assigned to my case to perform the following operation and/or procedure at the [facility] Office:    Bilateral Glenohumeral CSI under Ultrasound Guidance    2. My physician has explained the nature and purpose of the operation or other procedure, possible alternative methods of treatment, the risks involved, and the possibility of complication to me. I acknowledge that no guarantee has been made as to the result that may be obtained. 3.  I recognize that, during the course of this operation, or other procedure, unforseen conditions may necessitate additional or different procedure than those listed above. I, therefore, further authorize and request that the above named physician, his/her physician assistants or designees perform such procedures as are, in his/her professional opinion, necessary and desirable. 4.  Any tissue or organs removed in the operation or other procedure may be disposed of by and at the discretion of the [facility] Office staff and [facility] AT [facility]. 5.  I understand that in the event of a medical emergency, I will be transported by local paramedics to [facility] or other [facility] emergency department. 6.  I certify that I have read and fully understand the above consent to operation and/or other procedure. 7.  I acknowledge that my physician has explained sedation/analgesia administration to me including the risks and benefits. I consent to the administration of sedation/analgesia as may be necessary or desirable in the judgement of my physician. Witness signature: ___________________________________________________ Date:  ______/______/_____                    Time:  ________ A. M.  P.M.        Patient Name:  Tracy Nelson  3/26/1955  [id number]         Patient signature: ___________________________________________________                         Statement of Physician  My signature below affirms that prior to the time of the procedure, I have explained to the patient and/or his/her guardian, the risks and benefits involved in the proposed treatment and any reasonable alternative to the proposed treatment. I have also explained the risks and benefits involved in the refusal of the proposed treatment and have answered the patient's questions.                         Date:  ______/______/_______  Provider                      Signature:  __________________________________________________________       Time:  ___________ ARODOLFO LUCIA

## (undated) NOTE — LETTER
PIOTR Notifier: [employer]. Patient Name: Trent Goff Identification Number: [id number]                          Advance Beneficiary Notice of Noncoverage (ABN)   NOTE:  If Medicare doesn’t pay for D. item/service(s) below, you may have to pay.  Medicare does not pay for everything, even some care that you or your health care provider have good reason to think you need. We expect Medicare may not pay for the D. item/service(s) below.  D. Items or Services   BILATERAL subacromial CSI  E. Reason Medicare May Not Pay: F. Estimated Cost   __ EKG ($87.00)  __ Pap smear ($101) __Pelvic/Breast ($147.00)  __ Ear Irrigation ($138)  _x_ Injection(s)  ___ Tdap ($181)       ___ Meningitis ($290)   __Prevnar ($555)  ___ Td ($66)              ___ Prevnar 20 ($549)  ___ Hep A ($152)     ___ Prolia ($1827)         __ Xiaflex ($            )   ___ Hep B ($150)     ___ Pneumovax ($287)                                         ___ Vaccine Administration ($65)   __ Medicare does not cover this service      __ Medicare may not pay for this   item/service for your condition     __ Medicare may not pay for this item/service as often as this        WHAT YOU NEED TO DO NOW:  Read this notice, so you can make an informed decision about your care.  Ask us any questions that you may have after you finish reading.  Choose an option below about whether to receive the D. item/service(s) listed above.  Note: If you choose Option 1 or 2, we may help you to use any other insurance that you might have, but Medicare cannot require us to do this.  G. OPTIONS: Check only one box.  We cannot choose a box for you.   OPTION 1. I want the D. item/service(s) listed above. You may ask to be paid now, but I also want Medicare billed for an official decision on payment, which is sent to me on a Medicare Summary Notice (MSN). I understand that if Medicare doesn’t pay, I am responsible for payment, but I can appeal to Medicare by  following the directions on the MSN. If Medicare does pay, you will refund any payments I made to you, less co-pays or deductibles.  OPTION 2. I want the D. item/service(s) listed above, but do not bill Medicare. You may ask to be paid now as I am responsible for payment. I cannot appeal if Medicare is not billed.  OPTION 3. I don't want the D. item/service(s) listed above. I understand with this choice I am not responsible for payment, and I cannot appeal to see if Medicare would pay.    H. Additional Information:    This notice gives our opinion, not an official Medicare decision. If you have other questions on this notice or Medicare billing, call 1-800-MEDICARE (1-126.378.8768/TTY: [phone]). Signing below means that you have received and understand this notice. You also receive a copy.  I. Signature: J. Date:       You have the right to get Medicare information in an accessible format, like large print, Braille, or audio. You also have the right to file a complaint if you feel you’ve been discriminated against. Visit Medicare.gov/about- us/woztoxgjdxccw-ggbuschnnsgxvmiuo-gezexm.  According to the Paperwork Reduction Act of 1995, no persons are required to respond to a collection of information unless it displays a valid OMB control number. The valid OMB control number for this information collection is 9494-6623. The time required to complete this information collection is estimated to average 7 minutes per response, including the time to review instructions, search existing data resources, gather the data needed, and complete and review the information collection. If you have comments concerning the accuracy of the time estimate or suggestions for improving this form, please write to: CMS, [facility] Security     [place] Attn: VIRGINIE Reports Clearance Officer, [place], [place] [address].  Form CMS-R-131 (Exp. 1/31/2026) Form Approved OMB No. [id number]

## (undated) NOTE — LETTER
Notifier: [facility]       Patient Name: Trent Goff       Identification Number: [id number]                          Advance Beneficiary Notice of Noncoverage (ABN)   NOTE:  If Medicare doesn’t pay for D. Items/service(s) below, you may have to pay.  Medicare does not pay for everything, even some care that you or your health care provider have good reason to think you need. We expect Medicare may not pay for the D. items/service(s) below.  Items or Services  Bilateral subacromial injection Reason Medicare May Not Pay: Estimated Cost   __EKG ($129.00)  __Pap smear ($48.23) __Pelvic/Breast ($65.00)  __ Ear Irrigation ($149)  _x_ Injection(s)  ___ Tdap ($70)       ___ Meningitis ($206)   __Prevnar ($285)  ___ Td ($51)            ___Shingrix ($215)        __Prevnar 20 ($309)  ___ Hep A ($156)   ___Prolia ($1827.00)     __ Xiaflex ($              )   ___ Hep B ($167)      __Pneumovax ($155)                                            ___ Vaccine Administration ($31)   __ Medicare does not cover this service      __ Medicare may not pay for this   item/service for your condition     __ Medicare may not pay for this item/service as often as this   $2400.00     WHAT YOU NEED TO DO NOW:  Read this notice, so you can make an informed decision about your care.  Ask us any questions that you may have after you finish reading.  Choose an option below about whether to receive the D. item/service(s)  listed above.  Note: If you choose Option 1 or 2, we may help you to use any other insurance that you might have, but Medicare cannot require us to do this.  OPTIONS: Check only one box.  We cannot choose a box for you.   OPTION 1. I want the D. item/service(s) listed above. You may ask to be paid now, but I also want Medicare billed for an official decision on payment, which is sent to me on a Medicare Summary Notice (MSN). I understand that if Medicare doesn’t pay, I am responsible for payment, but I can appeal  to Medicare by following the directions on the MSN. If Medicare does pay, you will refund any payments I made to you, less co-pays or deductibles.  OPTION 2. I want the D. item/service(s) listed above, but do not bill Medicare. You may ask to be paid now as I am responsible for payment. I cannot appeal if Medicare is not billed.  OPTION 3. I don't want the D. item/service(s) listed above. I understand with this choice I am not responsible for payment, and I cannot appeal to see if Medicare would pay.    H. Additional Information:    This notice gives our opinion, not an official Medicare decision. If you have other questions on this notice or Medicare billing, call 1-800-MEDICARE (1-330.185.7049/TTY: [phone]). Signing below means that you have received and understand this notice. You also receive a copy.  Signature: Date:       You have the right to get Medicare information in an accessible format, like large print, Braille, or audio. You also have the right to file a complaint if you feel you’ve been discriminated against. Visit Medicare.gov/about- us/zlcdwhygvrpoa-jbgafqbabnwhmrwtz-bksfxe.  According to the Paperwork Reduction Act of 1995, no persons are required to respond to a collection of information unless it displays a valid OMB control number. The valid OMB control number for this information collection is 9715-7576. The time required to complete this information collection is estimated to average 7 minutes per response, including the time to review instructions, search existing data resources, gather the data needed, and complete and review the information collection. If you have comments concerning the accuracy of the time estimate or suggestions for improving this form, please write to: CMS, [facility] [employer]     [place] Attn: VIRGINIE Reports Clearance Officer, [place], [place] [address].  Form CMS-R-131 (Exp. 1/31/2026) Form Approved OMB No. [id number]

## (undated) NOTE — LETTER
Patient Name: Joselyn Greco  : 3/26/1955  MRN: [id number]  Patient Address: [address]      Coronavirus Disease 2019 (COVID-19)     [facility] is committed to the safety and well-being of our patients, members, Dolores Aguilera If your symptoms get worse, call your healthcare provider immediately. 3. Get rest and stay hydrated.    4. If you have a medical appointment, call the healthcare provider ahead of time and tell them that you have or may have COVID-19.  5. For medical damaso fever-reducing medications; and  · Improvement in respiratory symptoms (e.g., cough, shortness of breath); and  · At least 10 days have passed since symptoms first appeared OR if asymptomatic patient or date of symptom onset is unclear then use 10 days pos donors must:    · Have had a confirmed diagnosis of COVID-19  · Be symptom-free for at least 14 days*    *Some people will be required to have a repeat COVID-19 test in order to be eligible to donate.  If you’re instructed by [facility] that a repeat test is r random. Researchers are trying to identify similarities between people with a Post-COVID condition to better understand if there are risk factors. How do I prevent a Post-COVID condition?   The best way to prevent the long-term symptoms of COVID-19 is

## (undated) NOTE — LETTER
PIOTR Notifier: [facility]   SUKHJINDER. Patient Name: Cuca Fierro. Identification Number: [id number]      Advance Beneficiary Notice of Noncoverage (ABN)  NOTE:  If Medicare doesn’t pay for D. Item/service(s) below, you may have to pay.   Medicare does payment, and I cannot appeal to see if Medicare would pay. H. Additional Information: This notice gives our opinion, not an official Medicare decision.  If you have other questions on this notice or Medicare billing, call 1-800-MEDICARE ([phone]

## (undated) NOTE — LETTER
AUTHORIZATION FOR SURGICAL OPERATION OR OTHER PROCEDURE    1. I hereby authorize Dr. Alex Behar and the [facility] staff assigned to my case to perform the following operation and/or procedure at the [facility]:     BILATERAL subacromial CSI     2.  My physician has explained the nature and purpose of the operation or other procedure, possible alternative methods of treatment, the risks involved, and the possibility of complication to me.  I acknowledge that no guarantee has been made as to the result that may be obtained.  3.  I recognize that, during the course of this operation, or other procedure, unforseen conditions may necessitate additional or different procedure than those listed above.  I, therefore, further authorize and request that the above named physician, his/her physician assistants or designees perform such procedures as are, in his/her professional opinion, necessary and desirable.  4.  Any tissue or organs removed in the operation or other procedure may be disposed of by and at the discretion of the [facility] staff and [facility].  5.  I understand that in the event of a medical emergency, I will be transported by local paramedics to [facility] or other hospital emergency department.  6.  I certify that I have read and fully understand the above consent to operation and/or other procedure.    7.  I acknowledge that my physician has explained sedation/analgesia administration to me including the risks and benefits.  I consent to the administration of sedation/analgesia as may be necessary or desirable in the judgement of my physician.    Witness signature: ___________________________________________________ Date:  ______/______/_____                    Time:  ________ A.M.  P.M.       Patient Name:    Trent Goff  3/26/1955  [id number]     Patient signature:  ___________________________________________________               Statement of Physician  My  signature below affirms that prior to the time of the procedure, I have explained to the patient and/or his/her guardian, the risks and benefits involved in the proposed treatment and any reasonable alternative to the proposed treatment.  I have also explained the risks and benefits involved in the refusal of the proposed treatment and have answered the patient's questions.                        Date:  ______/______/_______  Provider                      Signature:  __________________________________________________________       Time:  ___________ A.M    P.M.